# Patient Record
Sex: MALE | Race: WHITE | Employment: FULL TIME | URBAN - METROPOLITAN AREA
[De-identification: names, ages, dates, MRNs, and addresses within clinical notes are randomized per-mention and may not be internally consistent; named-entity substitution may affect disease eponyms.]

---

## 2018-05-15 LAB
ABSOL LYMPHOCYTES (HISTORICAL): 3.3 K/UL (ref 0.5–4)
ALBUMIN SERPL BCP-MCNC: 4.6 G/DL (ref 3–5.2)
ALLEN TEST (HISTORICAL): ABNORMAL
ALP SERPL-CCNC: 88 U/L (ref 43–122)
ALT SERPL W P-5'-P-CCNC: 67 U/L (ref 9–52)
AMMONIA PLAS-SCNC: 107 UMOL/L (ref 9–33)
AMORPHOUS MATERIAL (HISTORICAL): ABNORMAL
AMPHETAMINE URINE (HISTORICAL): POSITIVE
ANION GAP SERPL CALCULATED.3IONS-SCNC: 26 MMOL/L (ref 5–14)
APTT PPP: 26 SEC (ref 23–31)
AST SERPL W P-5'-P-CCNC: 42 U/L (ref 17–59)
BACTERIA UR QL AUTO: ABNORMAL
BANDS (HISTORICAL): 2 % (ref 3–11)
BARBITURATE URINE (HISTORICAL): NEGATIVE
BASE DEFICIT (HISTORICAL): 5.8 "-"
BENZODIAZEPINE URINE (HISTORICAL): NEGATIVE
BILIRUB SERPL-MCNC: 0.7 MG/DL
BILIRUB UR QL STRIP: NEGATIVE MG/DL
BUN SERPL-MCNC: 21 MG/DL (ref 5–25)
CALCIUM SERPL-MCNC: 9.3 MG/DL (ref 8.4–10.2)
CALLED AND READ BACK BY. (HISTORICAL): NORMAL
CALLED AND READ BACK BY. (HISTORICAL): NORMAL
CARBOXYHEMOGLOBIN  (HISTORICAL): 2.5 % (ref 0.5–1.5)
CASTS/CASTS TYPE (HISTORICAL): 2 /LPF
CHLORIDE SERPL-SCNC: 101 MEQ/L (ref 97–108)
CK SERPL-CCNC: 309 U/L (ref 55–170)
CK SERPL-CCNC: 350 U/L (ref 55–170)
CK SERPL-CCNC: 361 U/L (ref 55–170)
CLARITY UR: CLEAR
CO2 SERPL-SCNC: 10 MMOL/L (ref 22–30)
COCAINE (METAB.), URINE (HISTORICAL): NEGATIVE
COLLECTION SITE (HISTORICAL): ABNORMAL
COLOR UR: ABNORMAL
COMMENT (HISTORICAL): ABNORMAL
CREATINE KINASE-MB FRACTION (HISTORICAL): 2.72 NG/ML (ref 0–2.37)
CREATINE KINASE-MB FRACTION (HISTORICAL): 2.84 NG/ML (ref 0–2.37)
CREATINE, SERUM (HISTORICAL): 1.32 MG/DL (ref 0.7–1.5)
CRYSTAL TYPE (HISTORICAL): ABNORMAL /HPF
DEPRECATED RDW RBC AUTO: 14.1 %
DRUG COMMENT (HISTORICAL): ABNORMAL
EGFR (HISTORICAL): 55 ML/MIN/1.73 M2
EOSINOPHIL # BLD AUTO: 0.1 K/UL (ref 0–0.4)
EOSINOPHIL NFR BLD AUTO: 1 % (ref 0–6)
GLUCOSE SERPL-MCNC: 140 MG/DL (ref 70–99)
GLUCOSE SERPL-MCNC: 159 MG/DL (ref 70–99)
GLUCOSE SERPL-MCNC: 342 MG/DL (ref 70–99)
GLUCOSE UR STRIP-MCNC: 100 MG/DL
HCO3 BLDA-SCNC: 20.1 MMOL/L (ref 22–26)
HCT VFR BLD AUTO: 46.8 % (ref 41–53)
HGB BLD-MCNC: 15.5 G/DL (ref 13.5–17.5)
HGB UR QL STRIP.AUTO: ABNORMAL
KETONES UR STRIP-MCNC: 50 MG/DL
LACTATE SERPL-SCNC: 0.8 MMOL/L (ref 0.7–2.1)
LACTATE SERPL-SCNC: 11.6 MMOL/L (ref 0.7–2.1)
LEUKOCYTE ESTERASE UR QL STRIP: ABNORMAL
LIPASE SERPL-CCNC: 98 U/L (ref 23–300)
LITER FLOW (HISTORICAL): 15 LFLOW
LYMPHOCYTES NFR BLD AUTO: 24 % (ref 25–45)
MAGNESIUM SERPL-MCNC: 2.2 MG/DL (ref 1.6–2.3)
MCH RBC QN AUTO: 28.3 PG (ref 26–34)
MCHC RBC AUTO-ENTMCNC: 33.1 % (ref 31–36)
MCV RBC AUTO: 85 FL (ref 80–100)
MDMA (GC/MS) (HISTORICAL): POSITIVE
METHADONE URINE (HISTORICAL): NEGATIVE
METHAMPHETAMINE URINE (HISTORICAL): POSITIVE
METHGB MFR BLDCO: 0.7 %
MONOCYTES # BLD AUTO: 1.2 K/UL (ref 0.2–0.9)
MONOCYTES NFR BLD AUTO: 9 % (ref 1–10)
MUCOUS THREADS URNS QL MICRO: ABNORMAL
NEUTROPHILS ABS COUNT (HISTORICAL): 9.1 K/UL (ref 1.8–7.8)
NEUTS SEG NFR BLD AUTO: 64 % (ref 45–65)
NITRITE UR QL STRIP: NEGATIVE
NON-SQ EPI CELLS URNS QL MICRO: ABNORMAL
O2 SAT (HISTORICAL): 97.1 % (ref 95–100)
O2 SAT CALCULATED (HISTORICAL): 100 % (ref 95–100)
OPIATES (HISTORICAL): NEGATIVE
OTHER STN SPEC: ABNORMAL
OXYCODONE (HISTORICAL): NEGATIVE
OXYGEN MODE (HISTORICAL): ABNORMAL
OXYHGB MFR BLDA: 94 %
PCO2 BLDA: 40 MMHG (ref 35–45)
PH BLDA: 7.31 [PH] (ref 7.35–7.45)
PH UR STRIP.AUTO: 5 [PH] (ref 4.5–8)
PHENCYCLIDINE URINE (HISTORICAL): NEGATIVE
PLATELET # BLD AUTO: 340 K/MCL (ref 150–450)
PO2 BLDA: 271 MMHG (ref 80–105)
POTASSIUM SERPL-SCNC: 4.2 MEQ/L (ref 3.6–5)
PROT UR STRIP-MCNC: >=500 MG/DL
PT - I.N. RATIO (HISTORICAL): 1 RATIO(INR)
PT, PATIENT (HISTORICAL): 10 SEC (ref 9.2–11.1)
RBC # BLD AUTO: 5.48 M/MCL (ref 4.5–5.9)
RBC #/AREA URNS AUTO: ABNORMAL /HPF
RBC MORPHOLOGY (HISTORICAL): ABNORMAL
SODIUM SERPL-SCNC: 137 MEQ/L (ref 137–147)
SP GR UR STRIP.AUTO: 1.02 (ref 1–1.04)
THC URINE (HISTORICAL): NEGATIVE
TOTAL PROTEIN (HISTORICAL): 7.3 G/DL (ref 5.9–8.4)
TRICYCLICS URINE (HISTORICAL): NEGATIVE
TROPONIN I SERPL-MCNC: 0.04 NG/ML (ref 0–0.03)
TROPONIN I SERPL-MCNC: 0.04 NG/ML (ref 0–0.03)
TROPONIN I SERPL-MCNC: <0.01 NG/ML (ref 0–0.03)
TSH SERPL DL<=0.05 MIU/L-ACNC: 2.63 UIU/ML (ref 0.47–4.68)
UROBILINOGEN UR QL STRIP.AUTO: 1 MG/DL (ref 0–1)
WBC # BLD AUTO: 13.7 K/MCL (ref 4.5–11)
WBC #/AREA URNS AUTO: 5 /HPF

## 2018-05-16 LAB
ABSOL LYMPHOCYTES (HISTORICAL): 2.1 K/UL (ref 0.5–4)
ALBUMIN SERPL BCP-MCNC: 3.7 G/DL (ref 3–5.2)
ALP SERPL-CCNC: 74 U/L (ref 43–122)
ALT SERPL W P-5'-P-CCNC: 65 U/L (ref 9–52)
AMMONIA PLAS-SCNC: <9 UMOL/L (ref 9–33)
ANION GAP SERPL CALCULATED.3IONS-SCNC: 9 MMOL/L (ref 5–14)
AST SERPL W P-5'-P-CCNC: 34 U/L (ref 17–59)
BASOPHILS # BLD AUTO: 0.1 K/UL (ref 0–0.1)
BASOPHILS # BLD AUTO: 1 % (ref 0–1)
BILIRUB SERPL-MCNC: 0.5 MG/DL
BUN SERPL-MCNC: 19 MG/DL (ref 5–25)
CALCIUM SERPL-MCNC: 8.7 MG/DL (ref 8.4–10.2)
CHLORIDE SERPL-SCNC: 108 MEQ/L (ref 97–108)
CK SERPL-CCNC: 323 U/L (ref 55–170)
CO2 SERPL-SCNC: 21 MMOL/L (ref 22–30)
CREATINE, SERUM (HISTORICAL): 0.82 MG/DL (ref 0.7–1.5)
DEPRECATED RDW RBC AUTO: 14 %
EGFR (HISTORICAL): >60 ML/MIN/1.73 M2
EOSINOPHIL # BLD AUTO: 0.2 K/UL (ref 0–0.4)
EOSINOPHIL NFR BLD AUTO: 1 % (ref 0–6)
EST. AVERAGE GLUCOSE BLD GHB EST-MCNC: 154 MG/DL
GLUCOSE SERPL-MCNC: 134 MG/DL (ref 70–99)
GLUCOSE SERPL-MCNC: 159 MG/DL (ref 70–99)
GLUCOSE SERPL-MCNC: 196 MG/DL (ref 70–99)
GLUCOSE SERPL-MCNC: 204 MG/DL (ref 70–99)
GLUCOSE SERPL-MCNC: 263 MG/DL (ref 70–99)
HBA1C MFR BLD HPLC: 7 %
HCT VFR BLD AUTO: 41.9 % (ref 41–53)
HEPATITIS A IGM ANTIBODY (HISTORICAL): NORMAL
HEPATITIS B CORE IGM ANTIBODY (HISTORICAL): NORMAL
HEPATITIS B SURFACE AG CONFIRMATION (HISTORICAL): NORMAL
HEPATITIS C ANTIBODY (HISTORICAL): NORMAL
HGB BLD-MCNC: 14 G/DL (ref 13.5–17.5)
LACTATE SERPL-SCNC: 0.7 MMOL/L (ref 0.7–2.1)
LYMPHOCYTES NFR BLD AUTO: 16 % (ref 25–45)
MCH RBC QN AUTO: 28.2 PG (ref 26–34)
MCHC RBC AUTO-ENTMCNC: 33.5 % (ref 31–36)
MCV RBC AUTO: 84 FL (ref 80–100)
MONOCYTES # BLD AUTO: 1.2 K/UL (ref 0.2–0.9)
MONOCYTES NFR BLD AUTO: 9 % (ref 1–10)
NEUTROPHILS ABS COUNT (HISTORICAL): 9.5 K/UL (ref 1.8–7.8)
NEUTS SEG NFR BLD AUTO: 73 % (ref 45–65)
PLATELET # BLD AUTO: 254 K/MCL (ref 150–450)
POTASSIUM SERPL-SCNC: 4.3 MEQ/L (ref 3.6–5)
RBC # BLD AUTO: 4.97 M/MCL (ref 4.5–5.9)
SODIUM SERPL-SCNC: 138 MEQ/L (ref 137–147)
TOTAL PROTEIN (HISTORICAL): 6.4 G/DL (ref 5.9–8.4)
TROPONIN I SERPL-MCNC: 0.01 NG/ML (ref 0–0.03)
TSH SERPL DL<=0.05 MIU/L-ACNC: 0.73 UIU/ML (ref 0.47–4.68)
WBC # BLD AUTO: 13 K/MCL (ref 4.5–11)

## 2018-05-17 LAB
GLUCOSE SERPL-MCNC: 170 MG/DL (ref 70–99)
GLUCOSE SERPL-MCNC: 176 MG/DL (ref 70–99)
GLUCOSE SERPL-MCNC: 219 MG/DL (ref 70–99)
GLUCOSE SERPL-MCNC: 259 MG/DL (ref 70–99)

## 2018-05-18 LAB
GLUCOSE SERPL-MCNC: 175 MG/DL (ref 70–99)
GLUCOSE SERPL-MCNC: 180 MG/DL (ref 70–99)
GLUCOSE SERPL-MCNC: 237 MG/DL (ref 70–99)
GLUCOSE SERPL-MCNC: 283 MG/DL (ref 70–99)

## 2018-05-19 LAB
GLUCOSE SERPL-MCNC: 176 MG/DL (ref 70–99)
GLUCOSE SERPL-MCNC: 200 MG/DL (ref 70–99)
GLUCOSE SERPL-MCNC: 219 MG/DL (ref 70–99)
GLUCOSE SERPL-MCNC: 226 MG/DL (ref 70–99)

## 2018-05-20 LAB
GLUCOSE SERPL-MCNC: 153 MG/DL (ref 70–99)
GLUCOSE SERPL-MCNC: 178 MG/DL (ref 70–99)
GLUCOSE SERPL-MCNC: 220 MG/DL (ref 70–99)
GLUCOSE SERPL-MCNC: 255 MG/DL (ref 70–99)

## 2018-05-21 LAB
GLUCOSE SERPL-MCNC: 172 MG/DL (ref 70–99)
GLUCOSE SERPL-MCNC: 215 MG/DL (ref 70–99)
GLUCOSE SERPL-MCNC: 229 MG/DL (ref 70–99)
GLUCOSE SERPL-MCNC: 235 MG/DL (ref 70–99)

## 2018-05-22 LAB
GLUCOSE SERPL-MCNC: 105 MG/DL (ref 70–99)
GLUCOSE SERPL-MCNC: 147 MG/DL (ref 70–99)
GLUCOSE SERPL-MCNC: 183 MG/DL (ref 70–99)
GLUCOSE SERPL-MCNC: 230 MG/DL (ref 70–99)

## 2018-05-23 LAB
ABSOL LYMPHOCYTES (HISTORICAL): 3 K/UL (ref 0.5–4)
BASOPHILS # BLD AUTO: 0.1 K/UL (ref 0–0.1)
BASOPHILS # BLD AUTO: 1 % (ref 0–1)
DEPRECATED RDW RBC AUTO: 14 %
EOSINOPHIL # BLD AUTO: 0.3 K/UL (ref 0–0.4)
EOSINOPHIL NFR BLD AUTO: 2 % (ref 0–6)
GLUCOSE SERPL-MCNC: 175 MG/DL (ref 70–99)
GLUCOSE SERPL-MCNC: 217 MG/DL (ref 70–99)
HCT VFR BLD AUTO: 46.1 % (ref 41–53)
HGB BLD-MCNC: 15.5 G/DL (ref 13.5–17.5)
LYMPHOCYTES NFR BLD AUTO: 23 % (ref 25–45)
MCH RBC QN AUTO: 28.5 PG (ref 26–34)
MCHC RBC AUTO-ENTMCNC: 33.6 % (ref 31–36)
MCV RBC AUTO: 85 FL (ref 80–100)
MONOCYTES # BLD AUTO: 0.9 K/UL (ref 0.2–0.9)
MONOCYTES NFR BLD AUTO: 7 % (ref 1–10)
NEUTROPHILS ABS COUNT (HISTORICAL): 8.9 K/UL (ref 1.8–7.8)
NEUTS SEG NFR BLD AUTO: 67 % (ref 45–65)
PLATELET # BLD AUTO: 303 K/MCL (ref 150–450)
RBC # BLD AUTO: 5.43 M/MCL (ref 4.5–5.9)
WBC # BLD AUTO: 13.2 K/MCL (ref 4.5–11)

## 2018-09-13 ENCOUNTER — APPOINTMENT (EMERGENCY)
Dept: RADIOLOGY | Facility: HOSPITAL | Age: 63
DRG: 683 | End: 2018-09-13
Payer: COMMERCIAL

## 2018-09-13 ENCOUNTER — APPOINTMENT (EMERGENCY)
Dept: CT IMAGING | Facility: HOSPITAL | Age: 63
DRG: 683 | End: 2018-09-13
Payer: COMMERCIAL

## 2018-09-13 ENCOUNTER — HOSPITAL ENCOUNTER (INPATIENT)
Facility: HOSPITAL | Age: 63
LOS: 4 days | DRG: 683 | End: 2018-09-17
Attending: EMERGENCY MEDICINE | Admitting: INTERNAL MEDICINE
Payer: COMMERCIAL

## 2018-09-13 DIAGNOSIS — R44.0 AUDITORY HALLUCINATIONS: ICD-10-CM

## 2018-09-13 DIAGNOSIS — D72.829 LEUKOCYTOSIS: ICD-10-CM

## 2018-09-13 DIAGNOSIS — N17.9 AKI (ACUTE KIDNEY INJURY) (HCC): Primary | ICD-10-CM

## 2018-09-13 DIAGNOSIS — R45.851 SUICIDAL IDEATION: ICD-10-CM

## 2018-09-13 DIAGNOSIS — F43.10 PTSD (POST-TRAUMATIC STRESS DISORDER): ICD-10-CM

## 2018-09-13 PROBLEM — R65.10 SIRS (SYSTEMIC INFLAMMATORY RESPONSE SYNDROME) (HCC): Status: ACTIVE | Noted: 2018-09-13

## 2018-09-13 PROBLEM — R79.89 ELEVATED D-DIMER: Status: ACTIVE | Noted: 2018-09-13

## 2018-09-13 PROBLEM — R00.0 SINUS TACHYCARDIA: Status: ACTIVE | Noted: 2018-09-13

## 2018-09-13 PROBLEM — F15.10 METHAMPHETAMINE USE (HCC): Status: ACTIVE | Noted: 2018-09-13

## 2018-09-13 PROBLEM — R07.9 CHEST PAIN: Status: ACTIVE | Noted: 2018-09-13

## 2018-09-13 PROBLEM — E11.9 DIABETES (HCC): Status: ACTIVE | Noted: 2018-09-13

## 2018-09-13 PROBLEM — E87.2 METABOLIC ACIDOSIS: Status: ACTIVE | Noted: 2018-09-13

## 2018-09-13 PROBLEM — G93.41 METABOLIC ENCEPHALOPATHY: Status: ACTIVE | Noted: 2018-09-13

## 2018-09-13 PROBLEM — R73.9 HYPERGLYCEMIA: Status: ACTIVE | Noted: 2018-09-13

## 2018-09-13 LAB
ACETONE SERPL-MCNC: NEGATIVE MG/DL
ALBUMIN SERPL BCP-MCNC: 3.8 G/DL (ref 3.5–5)
ALP SERPL-CCNC: 85 U/L (ref 46–116)
ALT SERPL W P-5'-P-CCNC: 82 U/L (ref 12–78)
AMPHETAMINES SERPL QL SCN: POSITIVE
ANION GAP SERPL CALCULATED.3IONS-SCNC: 16 MMOL/L (ref 4–13)
APAP SERPL-MCNC: <2 UG/ML (ref 10–30)
APTT PPP: 29 SECONDS (ref 24–36)
AST SERPL W P-5'-P-CCNC: 56 U/L (ref 5–45)
BACTERIA UR QL AUTO: ABNORMAL /HPF
BARBITURATES UR QL: NEGATIVE
BASE EX.OXY STD BLDV CALC-SCNC: 86.7 % (ref 60–80)
BASE EXCESS BLDV CALC-SCNC: -3 MMOL/L
BASOPHILS # BLD AUTO: 0.05 THOUSANDS/ΜL (ref 0–0.1)
BASOPHILS NFR BLD AUTO: 0 % (ref 0–1)
BENZODIAZ UR QL: NEGATIVE
BILIRUB SERPL-MCNC: 0.68 MG/DL (ref 0.2–1)
BILIRUB UR QL STRIP: ABNORMAL
BUN SERPL-MCNC: 25 MG/DL (ref 5–25)
CALCIUM SERPL-MCNC: 9.3 MG/DL (ref 8.3–10.1)
CHLORIDE SERPL-SCNC: 100 MMOL/L (ref 100–108)
CK MB SERPL-MCNC: 4.3 NG/ML (ref 0–5)
CK MB SERPL-MCNC: <1 % (ref 0–2.5)
CK SERPL-CCNC: 891 U/L (ref 39–308)
CLARITY UR: ABNORMAL
CO2 SERPL-SCNC: 20 MMOL/L (ref 21–32)
COCAINE UR QL: NEGATIVE
COLOR UR: ABNORMAL
CREAT SERPL-MCNC: 1.77 MG/DL (ref 0.6–1.3)
DEPRECATED D DIMER PPP: 685 NG/ML (FEU) (ref 0–424)
EOSINOPHIL # BLD AUTO: 0.02 THOUSAND/ΜL (ref 0–0.61)
EOSINOPHIL NFR BLD AUTO: 0 % (ref 0–6)
ERYTHROCYTE [DISTWIDTH] IN BLOOD BY AUTOMATED COUNT: 13.5 % (ref 11.6–15.1)
ETHANOL SERPL-MCNC: <3 MG/DL (ref 0–3)
GFR SERPL CREATININE-BSD FRML MDRD: 40 ML/MIN/1.73SQ M
GLUCOSE SERPL-MCNC: 116 MG/DL (ref 65–140)
GLUCOSE SERPL-MCNC: 335 MG/DL (ref 65–140)
GLUCOSE UR STRIP-MCNC: ABNORMAL MG/DL
HCO3 BLDV-SCNC: 20 MMOL/L (ref 24–30)
HCT VFR BLD AUTO: 45.8 % (ref 36.5–49.3)
HGB BLD-MCNC: 15.2 G/DL (ref 12–17)
HGB UR QL STRIP.AUTO: ABNORMAL
HYALINE CASTS #/AREA URNS LPF: ABNORMAL /LPF
IMM GRANULOCYTES # BLD AUTO: 0.22 THOUSAND/UL (ref 0–0.2)
IMM GRANULOCYTES NFR BLD AUTO: 1 % (ref 0–2)
INR PPP: 1.04 (ref 0.86–1.17)
KETONES UR STRIP-MCNC: ABNORMAL MG/DL
LACTATE SERPL-SCNC: 1.8 MMOL/L (ref 0.5–2)
LEUKOCYTE ESTERASE UR QL STRIP: NEGATIVE
LYMPHOCYTES # BLD AUTO: 1.48 THOUSANDS/ΜL (ref 0.6–4.47)
LYMPHOCYTES NFR BLD AUTO: 8 % (ref 14–44)
MCH RBC QN AUTO: 28.6 PG (ref 26.8–34.3)
MCHC RBC AUTO-ENTMCNC: 33.2 G/DL (ref 31.4–37.4)
MCV RBC AUTO: 86 FL (ref 82–98)
METHADONE UR QL: NEGATIVE
MONOCYTES # BLD AUTO: 0.99 THOUSAND/ΜL (ref 0.17–1.22)
MONOCYTES NFR BLD AUTO: 5 % (ref 4–12)
MUCOUS THREADS UR QL AUTO: ABNORMAL
NEUTROPHILS # BLD AUTO: 15.75 THOUSANDS/ΜL (ref 1.85–7.62)
NEUTS SEG NFR BLD AUTO: 86 % (ref 43–75)
NITRITE UR QL STRIP: NEGATIVE
NON-SQ EPI CELLS URNS QL MICRO: ABNORMAL /HPF
NRBC BLD AUTO-RTO: 0 /100 WBCS
O2 CT BLDV-SCNC: 19 ML/DL
OPIATES UR QL SCN: NEGATIVE
PCO2 BLDV: 30.9 MM HG (ref 42–50)
PCP UR QL: NEGATIVE
PH BLDV: 7.43 [PH] (ref 7.3–7.4)
PH UR STRIP.AUTO: 5.5 [PH] (ref 4.5–8)
PLATELET # BLD AUTO: 312 THOUSANDS/UL (ref 149–390)
PLATELET # BLD AUTO: 339 THOUSANDS/UL (ref 149–390)
PMV BLD AUTO: 9.4 FL (ref 8.9–12.7)
PMV BLD AUTO: 9.8 FL (ref 8.9–12.7)
PO2 BLDV: 55.9 MM HG (ref 35–45)
POTASSIUM SERPL-SCNC: 4.4 MMOL/L (ref 3.5–5.3)
PROCALCITONIN SERPL-MCNC: 0.16 NG/ML
PROT SERPL-MCNC: 8 G/DL (ref 6.4–8.2)
PROT UR STRIP-MCNC: >=300 MG/DL
PROTHROMBIN TIME: 13.7 SECONDS (ref 11.8–14.2)
RBC # BLD AUTO: 5.32 MILLION/UL (ref 3.88–5.62)
RBC #/AREA URNS AUTO: ABNORMAL /HPF
SALICYLATES SERPL-MCNC: 3.4 MG/DL (ref 3–20)
SODIUM SERPL-SCNC: 136 MMOL/L (ref 136–145)
SP GR UR STRIP.AUTO: >=1.03 (ref 1–1.03)
THC UR QL: NEGATIVE
TROPONIN I SERPL-MCNC: <0.02 NG/ML
TROPONIN I SERPL-MCNC: <0.02 NG/ML
UROBILINOGEN UR QL STRIP.AUTO: 0.2 E.U./DL
WBC # BLD AUTO: 18.51 THOUSAND/UL (ref 4.31–10.16)
WBC #/AREA URNS AUTO: ABNORMAL /HPF

## 2018-09-13 PROCEDURE — 84484 ASSAY OF TROPONIN QUANT: CPT | Performed by: EMERGENCY MEDICINE

## 2018-09-13 PROCEDURE — 99285 EMERGENCY DEPT VISIT HI MDM: CPT

## 2018-09-13 PROCEDURE — 85025 COMPLETE CBC W/AUTO DIFF WBC: CPT | Performed by: EMERGENCY MEDICINE

## 2018-09-13 PROCEDURE — 80329 ANALGESICS NON-OPIOID 1 OR 2: CPT | Performed by: STUDENT IN AN ORGANIZED HEALTH CARE EDUCATION/TRAINING PROGRAM

## 2018-09-13 PROCEDURE — 71046 X-RAY EXAM CHEST 2 VIEWS: CPT

## 2018-09-13 PROCEDURE — 83605 ASSAY OF LACTIC ACID: CPT | Performed by: STUDENT IN AN ORGANIZED HEALTH CARE EDUCATION/TRAINING PROGRAM

## 2018-09-13 PROCEDURE — 85049 AUTOMATED PLATELET COUNT: CPT | Performed by: PHYSICIAN ASSISTANT

## 2018-09-13 PROCEDURE — 36415 COLL VENOUS BLD VENIPUNCTURE: CPT | Performed by: EMERGENCY MEDICINE

## 2018-09-13 PROCEDURE — 93005 ELECTROCARDIOGRAM TRACING: CPT

## 2018-09-13 PROCEDURE — 99223 1ST HOSP IP/OBS HIGH 75: CPT | Performed by: PHYSICIAN ASSISTANT

## 2018-09-13 PROCEDURE — 94640 AIRWAY INHALATION TREATMENT: CPT

## 2018-09-13 PROCEDURE — 80307 DRUG TEST PRSMV CHEM ANLYZR: CPT | Performed by: STUDENT IN AN ORGANIZED HEALTH CARE EDUCATION/TRAINING PROGRAM

## 2018-09-13 PROCEDURE — 82009 KETONE BODYS QUAL: CPT | Performed by: STUDENT IN AN ORGANIZED HEALTH CARE EDUCATION/TRAINING PROGRAM

## 2018-09-13 PROCEDURE — G0480 DRUG TEST DEF 1-7 CLASSES: HCPCS | Performed by: STUDENT IN AN ORGANIZED HEALTH CARE EDUCATION/TRAINING PROGRAM

## 2018-09-13 PROCEDURE — 80053 COMPREHEN METABOLIC PANEL: CPT | Performed by: EMERGENCY MEDICINE

## 2018-09-13 PROCEDURE — 81001 URINALYSIS AUTO W/SCOPE: CPT

## 2018-09-13 PROCEDURE — 83036 HEMOGLOBIN GLYCOSYLATED A1C: CPT | Performed by: PHYSICIAN ASSISTANT

## 2018-09-13 PROCEDURE — 36415 COLL VENOUS BLD VENIPUNCTURE: CPT | Performed by: STUDENT IN AN ORGANIZED HEALTH CARE EDUCATION/TRAINING PROGRAM

## 2018-09-13 PROCEDURE — 82948 REAGENT STRIP/BLOOD GLUCOSE: CPT

## 2018-09-13 PROCEDURE — 80320 DRUG SCREEN QUANTALCOHOLS: CPT | Performed by: STUDENT IN AN ORGANIZED HEALTH CARE EDUCATION/TRAINING PROGRAM

## 2018-09-13 PROCEDURE — 85610 PROTHROMBIN TIME: CPT | Performed by: STUDENT IN AN ORGANIZED HEALTH CARE EDUCATION/TRAINING PROGRAM

## 2018-09-13 PROCEDURE — 84484 ASSAY OF TROPONIN QUANT: CPT | Performed by: PHYSICIAN ASSISTANT

## 2018-09-13 PROCEDURE — 96360 HYDRATION IV INFUSION INIT: CPT

## 2018-09-13 PROCEDURE — 84145 PROCALCITONIN (PCT): CPT | Performed by: STUDENT IN AN ORGANIZED HEALTH CARE EDUCATION/TRAINING PROGRAM

## 2018-09-13 PROCEDURE — 82550 ASSAY OF CK (CPK): CPT | Performed by: PHYSICIAN ASSISTANT

## 2018-09-13 PROCEDURE — 87040 BLOOD CULTURE FOR BACTERIA: CPT | Performed by: STUDENT IN AN ORGANIZED HEALTH CARE EDUCATION/TRAINING PROGRAM

## 2018-09-13 PROCEDURE — 82553 CREATINE MB FRACTION: CPT | Performed by: PHYSICIAN ASSISTANT

## 2018-09-13 PROCEDURE — 70450 CT HEAD/BRAIN W/O DYE: CPT

## 2018-09-13 PROCEDURE — 82805 BLOOD GASES W/O2 SATURATION: CPT | Performed by: STUDENT IN AN ORGANIZED HEALTH CARE EDUCATION/TRAINING PROGRAM

## 2018-09-13 PROCEDURE — 85730 THROMBOPLASTIN TIME PARTIAL: CPT | Performed by: STUDENT IN AN ORGANIZED HEALTH CARE EDUCATION/TRAINING PROGRAM

## 2018-09-13 PROCEDURE — 85379 FIBRIN DEGRADATION QUANT: CPT | Performed by: STUDENT IN AN ORGANIZED HEALTH CARE EDUCATION/TRAINING PROGRAM

## 2018-09-13 RX ORDER — ALBUTEROL SULFATE 2.5 MG/3ML
5 SOLUTION RESPIRATORY (INHALATION) ONCE
Status: COMPLETED | OUTPATIENT
Start: 2018-09-13 | End: 2018-09-13

## 2018-09-13 RX ORDER — ONDANSETRON 2 MG/ML
4 INJECTION INTRAMUSCULAR; INTRAVENOUS EVERY 6 HOURS PRN
Status: DISCONTINUED | OUTPATIENT
Start: 2018-09-13 | End: 2018-09-17 | Stop reason: HOSPADM

## 2018-09-13 RX ORDER — SODIUM CHLORIDE 9 MG/ML
125 INJECTION, SOLUTION INTRAVENOUS CONTINUOUS
Status: DISCONTINUED | OUTPATIENT
Start: 2018-09-13 | End: 2018-09-16

## 2018-09-13 RX ORDER — ASPIRIN 325 MG
325 TABLET ORAL ONCE
Status: COMPLETED | OUTPATIENT
Start: 2018-09-13 | End: 2018-09-13

## 2018-09-13 RX ORDER — HYDRALAZINE HYDROCHLORIDE 20 MG/ML
10 INJECTION INTRAMUSCULAR; INTRAVENOUS EVERY 6 HOURS PRN
Status: DISCONTINUED | OUTPATIENT
Start: 2018-09-13 | End: 2018-09-17 | Stop reason: HOSPADM

## 2018-09-13 RX ORDER — NICOTINE 21 MG/24HR
1 PATCH, TRANSDERMAL 24 HOURS TRANSDERMAL DAILY
Status: DISCONTINUED | OUTPATIENT
Start: 2018-09-14 | End: 2018-09-17 | Stop reason: HOSPADM

## 2018-09-13 RX ORDER — NITROGLYCERIN 0.4 MG/1
0.4 TABLET SUBLINGUAL
Status: DISCONTINUED | OUTPATIENT
Start: 2018-09-13 | End: 2018-09-17 | Stop reason: HOSPADM

## 2018-09-13 RX ORDER — ASPIRIN 81 MG/1
81 TABLET, CHEWABLE ORAL DAILY
Status: DISCONTINUED | OUTPATIENT
Start: 2018-09-14 | End: 2018-09-17 | Stop reason: HOSPADM

## 2018-09-13 RX ORDER — HEPARIN SODIUM 5000 [USP'U]/ML
5000 INJECTION, SOLUTION INTRAVENOUS; SUBCUTANEOUS EVERY 8 HOURS SCHEDULED
Status: DISCONTINUED | OUTPATIENT
Start: 2018-09-13 | End: 2018-09-17 | Stop reason: HOSPADM

## 2018-09-13 RX ORDER — LORAZEPAM 2 MG/ML
1 INJECTION INTRAMUSCULAR EVERY 4 HOURS PRN
Status: DISCONTINUED | OUTPATIENT
Start: 2018-09-13 | End: 2018-09-17 | Stop reason: HOSPADM

## 2018-09-13 RX ADMIN — SODIUM CHLORIDE 1000 ML: 0.9 INJECTION, SOLUTION INTRAVENOUS at 16:00

## 2018-09-13 RX ADMIN — ASPIRIN 325 MG: 325 TABLET ORAL at 23:00

## 2018-09-13 RX ADMIN — ALBUTEROL SULFATE 5 MG: 2.5 SOLUTION RESPIRATORY (INHALATION) at 17:03

## 2018-09-13 RX ADMIN — HEPARIN SODIUM 5000 UNITS: 5000 INJECTION INTRAVENOUS; SUBCUTANEOUS at 22:59

## 2018-09-13 RX ADMIN — SODIUM CHLORIDE 125 ML/HR: 0.9 INJECTION, SOLUTION INTRAVENOUS at 23:00

## 2018-09-13 RX ADMIN — IPRATROPIUM BROMIDE 0.5 MG: 0.5 SOLUTION RESPIRATORY (INHALATION) at 17:03

## 2018-09-13 NOTE — ED ATTENDING ATTESTATION
Samuel Girard MD, saw and evaluated the patient  I have discussed the patient with the resident/non-physician practitioner and agree with the resident's/non-physician practitioner's findings, Plan of Care, and MDM as documented in the resident's/non-physician practitioner's note, except where noted  All available labs and Radiology studies were reviewed  At this point I agree with the current assessment done in the Emergency Department  I have conducted an independent evaluation of this patient a history and physical is as follows:  Patient is a 71-year-old male, history of diabetes, hypertension, COPD, drug abuse, comes in with history of cocaine and meth use, has been having hallucinations; denies headache, fever  On exam patient is noted to have tachycardia, afebrile, blood pressure stable, oxygen saturation 96%, lung exam shows bilateral diffuse wheezing, cardiovascular tachycardia, regular rhythm, no peripheral edema, abdomen soft, tenderness in distension lower abdomen  Differential diagnosis: Altered mental status, tachycardia, drug abuse, sepsis, Pneumonia, UTI, COPD exacerbation, possible PE due to tachycardia with no fever  We will check labs including CBC, CMP, EKG, troponin, D-dimer; chest x-ray EKG; will give DuoNeb  Labs showed Leucocytosis, SIRS identified based on Tachycardia and Leucocytosis, Lactic acid was sent which was normal; GREG was noted with AG Metabolic acidosis in setting of Hyperglycemia, Acetone and VBG were added to labs; D-dimer was positive although if age adjustment considered its not significantly elevated, with GREG, and low suspicion of PE after return of lab work which is consistent with either sepsis or drug related effects, therefore decided to hold off on CT imaging for possible PE as risk of administering IV Dye outweighs benefit  CXR normal  Urine pending  If no clear source of sepsis, we will get CT Head   Patient under Resident Care, would need admission for possible Sepsis Vs drug related psychosis Vs COPD exacerbation; pending further workup; would need Psych eval as inpatient  Plan discussed with Dr Jalen Shirley, ER Attending during sign out      Critical Care Time  CritCare Time    Procedures

## 2018-09-13 NOTE — ED PROVIDER NOTES
History  Chief Complaint   Patient presents with    Recreational Drug Use     Pt states taking "speed" aka "jihan" on Monday and has been hallucinating since Tuesday  Pt states he sees and feels like someone is going to shoot him in the head  Pt also states he has been unable to urinate since Tuesday  Pt c/io left sided Chest tightness and shortness of breath that started today  This is a 80-year-old male with a past medical history hypertension, hyperlipidemia, and diabetes who presents to the emergency department this afternoon with visual hallucinations since Monday  Patient states that he has been snorting methamphetamine since Monday and ran out last night  He states that prior to the methamphetamine he was not having any hallucinations and he felt his normal self  After snorting methamphetamine the patient states that he feels like someone is sneaking up behind him in order to shoot him and that snakes were chasing him  Patient also endorses having suicidal ideations over the past couple days but denies any current are active suicidal ideations  Patient states that he has been on disability for the past four weeks since he tore his hamstring on a fishing trip  He usually works as a   Patient states that he typically takes medications for his high blood pressure and diabetes but he has an over the past couple days since he has been doing meth  Patient endorses having left-sided chest pain but no associated diaphoresis, shortness of breath, nausea, vomiting, or radiation of the pain  He denies any prior heart history  None       Past Medical History:   Diagnosis Date    Asbestos exposure     Diabetes mellitus ([facility] [address] )     Drug use     Hypertension     Lung disease        Past Surgical History:   Procedure Laterality Date    NO PAST SURGERIES         No family history on file  I have reviewed and agree with the history as documented      Social History   Substance Use Topics    Smoking status: Current Every Day Smoker     Packs/day: 2 00    Smokeless tobacco: Never Used    Alcohol use No        Review of Systems   Constitutional: Negative for chills, diaphoresis and fever  HENT: Negative for congestion, rhinorrhea, sinus pressure and sore throat  Eyes: Negative for visual disturbance  Respiratory: Negative for cough, chest tightness and shortness of breath  Cardiovascular: Positive for chest pain  Gastrointestinal: Negative for abdominal pain, constipation, diarrhea, nausea and vomiting  Genitourinary: Positive for decreased urine volume  Negative for dysuria, frequency, hematuria and urgency  Musculoskeletal: Negative for arthralgias and myalgias  Skin: Negative for color change and rash  Neurological: Negative for dizziness, numbness and headaches  Psychiatric/Behavioral: Positive for hallucinations and suicidal ideas  Physical Exam  ED Triage Vitals   Temperature Pulse Respirations Blood Pressure SpO2   09/13/18 1547 09/13/18 1547 09/13/18 1547 09/13/18 1547 09/13/18 1547   98 2 °F (36 8 °C) (!) 130 20 129/70 96 %      Temp Source Heart Rate Source Patient Position - Orthostatic VS BP Location FiO2 (%)   09/13/18 1547 09/13/18 1547 09/13/18 1547 09/13/18 1547 --   Oral Monitor Lying Right arm       Pain Score       09/13/18 2148       2           Orthostatic Vital Signs  Vitals:    09/13/18 1900 09/13/18 1930 09/13/18 2000 09/13/18 2147   BP: 127/63 138/75 133/71 130/77   Pulse: 96  92 96   Patient Position - Orthostatic VS: Lying Lying Lying Lying       Physical Exam   Constitutional: He is oriented to person, place, and time  He appears well-developed and well-nourished  No distress  HENT:   Head: Normocephalic and atraumatic  Mouth/Throat: No oropharyngeal exudate  Eyes: Conjunctivae are normal  Pupils are equal, round, and reactive to light  Right eye exhibits no discharge  Left eye exhibits no discharge  No scleral icterus     Neck: Normal range of motion  Neck supple  No JVD present  Cardiovascular: Normal rate, regular rhythm and normal heart sounds  Exam reveals no gallop and no friction rub  No murmur heard  Pulmonary/Chest: Effort normal  No stridor  No respiratory distress  He has wheezes  He has no rales  He exhibits no tenderness  Abdominal: Soft  Bowel sounds are normal  He exhibits mass ( suprapubic)  He exhibits no distension  There is tenderness ( suprapubic)  There is no guarding  Musculoskeletal: Normal range of motion  He exhibits no edema, tenderness or deformity  Neurological: He is alert and oriented to person, place, and time  No cranial nerve deficit or sensory deficit  He exhibits normal muscle tone  Skin: Skin is warm and dry  No rash noted  He is not diaphoretic  No erythema  No pallor  Psychiatric: He has a normal mood and affect  His behavior is normal    Nursing note and vitals reviewed        ED Medications  Medications   sodium chloride 0 9 % infusion (not administered)   ondansetron (ZOFRAN) injection 4 mg (not administered)   nicotine (NICODERM CQ) 14 mg/24hr TD 24 hr patch 1 patch (not administered)   aspirin tablet 325 mg (not administered)   aspirin chewable tablet 81 mg (not administered)   heparin (porcine) subcutaneous injection 5,000 Units (not administered)   insulin lispro (HumaLOG) 100 units/mL subcutaneous injection 1-6 Units (1 Units Subcutaneous Not Given 9/13/18 2256)   LORazepam (ATIVAN) 2 mg/mL injection 1 mg (not administered)   hydrALAZINE (APRESOLINE) injection 10 mg (not administered)   nitroglycerin (NITROSTAT) SL tablet 0 4 mg (not administered)   sodium chloride 0 9 % bolus 1,000 mL (0 mL Intravenous Stopped 9/13/18 1702)   albuterol inhalation solution 5 mg (5 mg Nebulization Given 9/13/18 1703)   ipratropium (ATROVENT) 0 02 % inhalation solution 0 5 mg (0 5 mg Nebulization Given 9/13/18 1703)       Diagnostic Studies  Results Reviewed     Procedure Component Value Units Date/Time    CK (with reflex to MB) [[id number]] Collected:  09/13/18 2245    Lab Status: In process Specimen:  Blood from Hand, Right Updated:  09/13/18 2252    Acetone [[id number]]  (Normal) Collected:  09/13/18 1708    Lab Status:  Final result Specimen:  Blood from Arm, Left Updated:  09/13/18 1753     Acetone, Bld Negative    Procalcitonin [[id number]]  (Normal) Collected:  09/13/18 1552    Lab Status:  Final result Specimen:  Blood from Arm, Right Updated:  09/13/18 1743     Procalcitonin 0 16 ng/ml     Urine Microscopic [[id number]]  (Abnormal) Collected:  09/13/18 1643    Lab Status:  Final result Specimen:  Urine from Urine, Other Updated:  09/13/18 1729     RBC, UA 0-1 (A) /hpf      WBC, UA 4-10 (A) /hpf      Epithelial Cells Occasional /hpf      Bacteria, UA Occasional /hpf      Hyaline Casts, UA 4-10 (A) /lpf      MUCOUS THREADS Occasional (A)    Acetaminophen level [[id number]]  (Abnormal) Collected:  09/13/18 1552    Lab Status:  Final result Specimen:  Blood from Arm, Right Updated:  09/13/18 1701     Acetaminophen Level <2 (L) ug/mL     Rapid drug screen, urine [[id number]]  (Abnormal) Collected:  09/13/18 1635    Lab Status:  Final result Specimen:  Urine from Urine, Other Updated:  09/13/18 1700     Amph/Meth UR Positive (A)     Barbiturate Ur Negative     Benzodiazepine Urine Negative     Cocaine Urine Negative     Methadone Urine Negative     Opiate Urine Negative     PCP Ur Negative     THC Urine Negative    Narrative:         FOR MEDICAL PURPOSES ONLY  IF CONFIRMATION NEEDED PLEASE CONTACT THE LAB WITHIN 5 DAYS      Drug Screen Cutoff Levels:  AMPHETAMINE/METHAMPHETAMINES  1000 ng/mL  BARBITURATES     200 ng/mL  BENZODIAZEPINES     200 ng/mL  COCAINE      300 ng/mL  METHADONE      300 ng/mL  OPIATES      300 ng/mL  PHENCYCLIDINE     25 ng/mL  THC       50 ng/mL    Ethanol [[id number]]  (Normal) Collected:  09/13/18 1552    Lab Status:  Final result Specimen:  Blood from Arm, Right Updated:  09/13/18 1656 Ethanol Lvl <3 mg/dL     Salicylate level [[id number]]  (Normal) Collected:  09/13/18 1552    Lab Status:  Final result Specimen:  Blood from Arm, Right Updated:  57/17/53 0705     Salicylate Lvl 3 4 mg/dL     POCT urinalysis dipstick [[id number]]  (Abnormal) Resulted:  09/13/18 1636    Lab Status:  Final result Specimen:  Urine Updated:  09/13/18 1636    ED Urine Macroscopic [[id number]]  (Abnormal) Collected:  09/13/18 1643    Lab Status:  Final result Specimen:  Urine Updated:  09/13/18 1633     Color, UA Carmencita     Clarity, UA Slightly Cloudy     pH, UA 5 5     Leukocytes, UA Negative     Nitrite, UA Negative     Protein, UA >=300 (A) mg/dl      Glucose,  (1/4%) (A) mg/dl      Ketones, UA 40 (2+) (A) mg/dl      Urobilinogen, UA 0 2 E U /dl      Bilirubin, UA Interference- unable to analyze (A)     Blood, UA Small (A)     Specific Gravity, UA >=1 030    Narrative:       CLINITEK RESULT    Lactic acid x2 [[id number]]  (Normal) Collected:  09/13/18 1552    Lab Status:  Final result Specimen:  Blood from Arm, Right Updated:  09/13/18 1618     LACTIC ACID 1 8 mmol/L     Narrative:         Result may be elevated if tourniquet was used during collection      D-dimer, quantitative [[id number]]  (Abnormal) Collected:  09/13/18 1552    Lab Status:  Final result Specimen:  Blood from Arm, Right Updated:  09/13/18 1614     D-Dimer, Quant 685 (H) ng/ml (FEU)     Protime-INR [[id number]]  (Normal) Collected:  09/13/18 1552    Lab Status:  Final result Specimen:  Blood from Arm, Right Updated:  09/13/18 1611     Protime 13 7 seconds      INR 1 04    APTT [[id number]]  (Normal) Collected:  09/13/18 1552    Lab Status:  Final result Specimen:  Blood from Arm, Right Updated:  09/13/18 1611     PTT 29 seconds     Blood gas, venous [[id number]]  (Abnormal) Collected:  09/13/18 1600    Lab Status:  Final result Specimen:  Blood from Arm, Left Updated:  09/13/18 1607     pH, Estiven 7 430 (H)     pCO2, Estiven 30 9 (L) mm Hg      pO2, Estiven 55 9 (H) mm Hg      HCO3, Estiven 20 0 (L) mmol/L      Base Excess, Estiven -3 0 mmol/L      O2 Content, Estiven 19 0 ml/dL      O2 HGB, VENOUS 86 7 (H) %     Narrative: Therapeutic levels (1 mg/mL and 2 mg/mL) of hydroxocobalamin may interfere with the fCOHb and fMetHb where it may cause lower than expected values    Blood culture #2 [[id number]] Collected:  09/13/18 1600    Lab Status: In process Specimen:  Blood from Arm, Left Updated:  09/13/18 1604    Blood culture #1 [[id number]] Collected:  09/13/18 1552    Lab Status: In process Specimen:  Blood from Arm, Right Updated:  09/13/18 1557    Comprehensive metabolic panel [[id number]]  (Abnormal) Collected:  09/13/18 1510    Lab Status:  Final result Specimen:  Blood from Arm, Left Updated:  09/13/18 1541     Sodium 136 mmol/L      Potassium 4 4 mmol/L      Chloride 100 mmol/L      CO2 20 (L) mmol/L      ANION GAP 16 (H) mmol/L      BUN 25 mg/dL      Creatinine 1 77 (H) mg/dL      Glucose 335 (H) mg/dL      Calcium 9 3 mg/dL      AST 56 (H) U/L      ALT 82 (H) U/L      Alkaline Phosphatase 85 U/L      Total Protein 8 0 g/dL      Albumin 3 8 g/dL      Total Bilirubin 0 68 mg/dL      eGFR 40 ml/min/1 73sq m     Narrative:         National Kidney Disease Education Program recommendations are as follows:  GFR calculation is accurate only with a steady state creatinine  Chronic Kidney disease less than 60 ml/min/1 73 sq  meters  Kidney failure less than 15 ml/min/1 73 sq  meters      Troponin I [[id number]]  (Normal) Collected:  09/13/18 1510    Lab Status:  Final result Specimen:  Blood from Arm, Left Updated:  09/13/18 1538     Troponin I <0 02 ng/mL     CBC and differential [[id number]]  (Abnormal) Collected:  09/13/18 1510    Lab Status:  Final result Specimen:  Blood from Arm, Left Updated:  09/13/18 1520     WBC 18 51 (H) Thousand/uL      RBC 5 32 Million/uL      Hemoglobin 15 2 g/dL      Hematocrit 45 8 %      MCV 86 fL      MCH 28 6 pg      MCHC 33 2 g/dL      RDW 13 5 %      MPV 9 8 fL      Platelets 322 Thousands/uL      nRBC 0 /100 WBCs      Neutrophils Relative 86 (H) %      Immat GRANS % 1 %      Lymphocytes Relative 8 (L) %      Monocytes Relative 5 %      Eosinophils Relative 0 %      Basophils Relative 0 %      Neutrophils Absolute 15 75 (H) Thousands/µL      Immature Grans Absolute 0 22 (H) Thousand/uL      Lymphocytes Absolute 1 48 Thousands/µL      Monocytes Absolute 0 99 Thousand/µL      Eosinophils Absolute 0 02 Thousand/µL      Basophils Absolute 0 05 Thousands/µL                  CT head without contrast   Final Result by Tolu Viramontes MD (09/13 1824)      No acute intracranial abnormality  Workstation performed: [id number]         X-ray chest 2 views   Final Result by Anoop Khan MD (09/13 1541)      No acute cardiopulmonary disease  Workstation performed: [id number]               Procedures  Procedures      Phone Consults  ED Phone Contact    ED Course  ED Course as of Sep 13 2259   Thu Sep 13, 2018   1756 WBC: (!) 18 51   1757 Anion Gap: (!) 16   1757 Glucose: (!) 335                         Initial Sepsis Screening     Row Name 09/13/18 1539                Is the patient's history suggestive of a new or worsening infection? No  -JL        Suspected source of infection          Are two or more of the following signs & symptoms of infection both present and new to the patient? (!)  Yes (Proceed)  -JL        Indicate SIRS criteria Tachycardia > 90 bpm;Leukocytosis (WBC > 96527 IJL)  -JL        If the answer is yes to both questions, suspicion of sepsis is present          If severe sepsis is present AND tissue hypoperfusion perists in the hour after fluid resuscitation or lactate > 4, the patient meets criteria for SEPTIC SHOCK          Are any of the following organ dysfunction criteria present within 6 hours of suspected infection and SIRS criteria that are NOT considered to be chronic conditions?           Organ dysfunction          Date of presentation of severe sepsis          Time of presentation of severe sepsis          Tissue hypoperfusion persists in the hour after crystalloid fluid administration, evidenced, by either:          Was hypotension present within one hour of the conclusion of crystalloid fluid administration?         Date of presentation of septic shock          Time of presentation of septic shock            User Key  (r) = Recorded By, (t) = Taken By, (c) = Cosigned By    Initials Name Provider Type    Jo-Ann Roldan MD Resident          GRICELDA Risk Score      Most Recent Value   Age >= 72  0 Filed at: 09/13/2018 2147   Known CAD (stenosis >= 50%)  0 Filed at: 09/13/2018 2147   Recent (<=24 hrs) Service Angina  0 Filed at: 09/13/2018 2147   ST Deviation >= 0 5 mm  0 Filed at: 09/13/2018 2147   3+ CAD Risk Factors (FHx, HTN, HLP, DM, Smoker)  1 Filed at: 09/13/2018 2147   Aspirin Use Past 7 Days  0 Filed at: 09/13/2018 2147   Elevated Cardiac Markers  0 Filed at: 09/13/2018 2147   GRICELDA Risk Score (Calculated)  1 Filed at: 09/13/2018 2147              MDM  Number of Diagnoses or Management Options  GREG (acute kidney injury) ([facility] ):   Leukocytosis:   Diagnosis management comments: Patient was initially tachycardic complaining of some chest pain and a D-dimer was ordered  However, when the patient's white blood cell count returned elevated and he had an GREG the decision was made to not pursue a CT PE of the chest because his heart rate was likely secondary to the sepsis and we did not want to risk damaging his kidneys further with a dye load  Patient's septic workup was unremarkable and despite his anion gap of 16 and elevated blood sugar he did not have any acetone in his blood suggestive of DKA and his VBG showed a normal pH  Spoke with slim who agreed to admit the patient to their service as an inpatient      CritCare Time    Disposition  Final diagnoses:   GREG (acute kidney injury) ([facility] )   Leukocytosis     Time reflects when diagnosis was documented in both MDM as applicable and the Disposition within this note     Time User Action Codes Description Comment    9/13/2018  8:01 PM Meg Ouch [N17 9] GREG (acute kidney injury) ([facility] [address] )     9/13/2018  8:01 PM Meg Ouch [K41 [id number]] Leukocytosis     9/13/2018  9:58 PM Jerome Pion M Add [R45 851] Suicidal ideation     9/13/2018  9:58 PM Urbano Snuffer Add [R44 0] Auditory hallucinations     9/13/2018  9:58 PM Clement Snuffer Add [F43 10] PTSD (post-traumatic stress disorder)       ED Disposition     ED Disposition Condition Comment    Admit  Case was discussed with LICO and the patient's admission status was agreed to be Admission Status: inpatient status to the service of Dr Alycia Guillen   Follow-up Information    None         There are no discharge medications for this patient  No discharge procedures on file  ED Provider  Attending physically available and evaluated Zoila Us  MARCO managed the patient along with the ED Attending      Electronically Signed by         Marquis Blu MD  09/13/18 5083

## 2018-09-14 LAB
ALBUMIN SERPL BCP-MCNC: 3.2 G/DL (ref 3.5–5)
ALP SERPL-CCNC: 69 U/L (ref 46–116)
ALT SERPL W P-5'-P-CCNC: 73 U/L (ref 12–78)
ANION GAP SERPL CALCULATED.3IONS-SCNC: 11 MMOL/L (ref 4–13)
AST SERPL W P-5'-P-CCNC: 48 U/L (ref 5–45)
ATRIAL RATE: 125 BPM
BASOPHILS # BLD AUTO: 0.07 THOUSANDS/ΜL (ref 0–0.1)
BASOPHILS NFR BLD AUTO: 1 % (ref 0–1)
BILIRUB SERPL-MCNC: 0.33 MG/DL (ref 0.2–1)
BUN SERPL-MCNC: 28 MG/DL (ref 5–25)
CALCIUM SERPL-MCNC: 8.6 MG/DL (ref 8.3–10.1)
CHLORIDE SERPL-SCNC: 107 MMOL/L (ref 100–108)
CHOLEST SERPL-MCNC: 186 MG/DL (ref 50–200)
CO2 SERPL-SCNC: 24 MMOL/L (ref 21–32)
CREAT SERPL-MCNC: 1.29 MG/DL (ref 0.6–1.3)
EOSINOPHIL # BLD AUTO: 0.41 THOUSAND/ΜL (ref 0–0.61)
EOSINOPHIL NFR BLD AUTO: 3 % (ref 0–6)
ERYTHROCYTE [DISTWIDTH] IN BLOOD BY AUTOMATED COUNT: 13.7 % (ref 11.6–15.1)
EST. AVERAGE GLUCOSE BLD GHB EST-MCNC: 154 MG/DL
GFR SERPL CREATININE-BSD FRML MDRD: 59 ML/MIN/1.73SQ M
GLUCOSE SERPL-MCNC: 167 MG/DL (ref 65–140)
GLUCOSE SERPL-MCNC: 185 MG/DL (ref 65–140)
GLUCOSE SERPL-MCNC: 202 MG/DL (ref 65–140)
GLUCOSE SERPL-MCNC: 208 MG/DL (ref 65–140)
GLUCOSE SERPL-MCNC: 260 MG/DL (ref 65–140)
HBA1C MFR BLD: 7 % (ref 4.2–6.3)
HCT VFR BLD AUTO: 40.8 % (ref 36.5–49.3)
HDLC SERPL-MCNC: 23 MG/DL (ref 40–60)
HGB BLD-MCNC: 13.6 G/DL (ref 12–17)
IMM GRANULOCYTES # BLD AUTO: 0.08 THOUSAND/UL (ref 0–0.2)
IMM GRANULOCYTES NFR BLD AUTO: 1 % (ref 0–2)
LDLC SERPL CALC-MCNC: 104 MG/DL (ref 0–100)
LYMPHOCYTES # BLD AUTO: 4.21 THOUSANDS/ΜL (ref 0.6–4.47)
LYMPHOCYTES NFR BLD AUTO: 28 % (ref 14–44)
MCH RBC QN AUTO: 29 PG (ref 26.8–34.3)
MCHC RBC AUTO-ENTMCNC: 33.3 G/DL (ref 31.4–37.4)
MCV RBC AUTO: 87 FL (ref 82–98)
MONOCYTES # BLD AUTO: 1.11 THOUSAND/ΜL (ref 0.17–1.22)
MONOCYTES NFR BLD AUTO: 7 % (ref 4–12)
NEUTROPHILS # BLD AUTO: 9.13 THOUSANDS/ΜL (ref 1.85–7.62)
NEUTS SEG NFR BLD AUTO: 60 % (ref 43–75)
NRBC BLD AUTO-RTO: 0 /100 WBCS
P AXIS: 65 DEGREES
PLATELET # BLD AUTO: 308 THOUSANDS/UL (ref 149–390)
PMV BLD AUTO: 10.3 FL (ref 8.9–12.7)
POTASSIUM SERPL-SCNC: 3.6 MMOL/L (ref 3.5–5.3)
PR INTERVAL: 134 MS
PROT SERPL-MCNC: 6.8 G/DL (ref 6.4–8.2)
QRS AXIS: 80 DEGREES
QRSD INTERVAL: 98 MS
QT INTERVAL: 322 MS
QTC INTERVAL: 464 MS
RBC # BLD AUTO: 4.69 MILLION/UL (ref 3.88–5.62)
SODIUM SERPL-SCNC: 142 MMOL/L (ref 136–145)
T WAVE AXIS: 40 DEGREES
TRIGL SERPL-MCNC: 297 MG/DL
TROPONIN I SERPL-MCNC: <0.02 NG/ML
VENTRICULAR RATE: 125 BPM
WBC # BLD AUTO: 15.01 THOUSAND/UL (ref 4.31–10.16)

## 2018-09-14 PROCEDURE — 99253 IP/OBS CNSLTJ NEW/EST LOW 45: CPT | Performed by: PSYCHIATRY & NEUROLOGY

## 2018-09-14 PROCEDURE — 93005 ELECTROCARDIOGRAM TRACING: CPT

## 2018-09-14 PROCEDURE — 99232 SBSQ HOSP IP/OBS MODERATE 35: CPT | Performed by: INTERNAL MEDICINE

## 2018-09-14 PROCEDURE — 80053 COMPREHEN METABOLIC PANEL: CPT | Performed by: PHYSICIAN ASSISTANT

## 2018-09-14 PROCEDURE — 82948 REAGENT STRIP/BLOOD GLUCOSE: CPT

## 2018-09-14 PROCEDURE — 80061 LIPID PANEL: CPT | Performed by: PHYSICIAN ASSISTANT

## 2018-09-14 PROCEDURE — 84484 ASSAY OF TROPONIN QUANT: CPT | Performed by: PHYSICIAN ASSISTANT

## 2018-09-14 PROCEDURE — 85025 COMPLETE CBC W/AUTO DIFF WBC: CPT | Performed by: PHYSICIAN ASSISTANT

## 2018-09-14 PROCEDURE — 93010 ELECTROCARDIOGRAM REPORT: CPT | Performed by: INTERNAL MEDICINE

## 2018-09-14 RX ORDER — ALBUTEROL SULFATE 90 UG/1
2 AEROSOL, METERED RESPIRATORY (INHALATION) EVERY 4 HOURS
Status: ON HOLD | COMMUNITY
Start: 2018-08-15 | End: 2018-09-14 | Stop reason: SDUPTHER

## 2018-09-14 RX ORDER — LORAZEPAM 2 MG/ML
1 INJECTION INTRAMUSCULAR EVERY 6 HOURS PRN
Status: DISCONTINUED | OUTPATIENT
Start: 2018-09-14 | End: 2018-09-17

## 2018-09-14 RX ORDER — ALBUTEROL SULFATE 90 UG/1
2 AEROSOL, METERED RESPIRATORY (INHALATION) EVERY 6 HOURS PRN
Status: ON HOLD | COMMUNITY
End: 2018-09-20

## 2018-09-14 RX ORDER — OLANZAPINE 5 MG/1
5 TABLET ORAL
Status: DISCONTINUED | OUTPATIENT
Start: 2018-09-14 | End: 2018-09-17 | Stop reason: HOSPADM

## 2018-09-14 RX ADMIN — ASPIRIN 81 MG 81 MG: 81 TABLET ORAL at 08:55

## 2018-09-14 RX ADMIN — LORAZEPAM 1 MG: 2 INJECTION INTRAMUSCULAR; INTRAVENOUS at 15:17

## 2018-09-14 RX ADMIN — INSULIN LISPRO 1 UNITS: 100 INJECTION, SOLUTION INTRAVENOUS; SUBCUTANEOUS at 17:59

## 2018-09-14 RX ADMIN — NITROGLYCERIN 0.4 MG: 0.4 TABLET SUBLINGUAL at 21:35

## 2018-09-14 RX ADMIN — SODIUM CHLORIDE 125 ML/HR: 0.9 INJECTION, SOLUTION INTRAVENOUS at 22:06

## 2018-09-14 RX ADMIN — INSULIN LISPRO 2 UNITS: 100 INJECTION, SOLUTION INTRAVENOUS; SUBCUTANEOUS at 21:25

## 2018-09-14 RX ADMIN — INSULIN LISPRO 3 UNITS: 100 INJECTION, SOLUTION INTRAVENOUS; SUBCUTANEOUS at 12:14

## 2018-09-14 RX ADMIN — SODIUM CHLORIDE 125 ML/HR: 0.9 INJECTION, SOLUTION INTRAVENOUS at 06:34

## 2018-09-14 RX ADMIN — HEPARIN SODIUM 5000 UNITS: 5000 INJECTION INTRAVENOUS; SUBCUTANEOUS at 06:27

## 2018-09-14 RX ADMIN — HEPARIN SODIUM 5000 UNITS: 5000 INJECTION INTRAVENOUS; SUBCUTANEOUS at 21:25

## 2018-09-14 RX ADMIN — INSULIN LISPRO 1 UNITS: 100 INJECTION, SOLUTION INTRAVENOUS; SUBCUTANEOUS at 08:54

## 2018-09-14 RX ADMIN — HEPARIN SODIUM 5000 UNITS: 5000 INJECTION INTRAVENOUS; SUBCUTANEOUS at 13:54

## 2018-09-14 RX ADMIN — OLANZAPINE 5 MG: 5 TABLET, FILM COATED ORAL at 21:25

## 2018-09-14 RX ADMIN — NICOTINE 1 PATCH: 14 PATCH, EXTENDED RELEASE TRANSDERMAL at 08:55

## 2018-09-14 NOTE — ASSESSMENT & PLAN NOTE
May be 2* methamphetamine use, pt does have RF for ACS however w/ boyd score of 1  ekg nsr and nonischemic, cxr w/o widened mediastinum  Will give asa 325mg once, trend troponins/ekgs, check hgb a1c and lipid panel

## 2018-09-14 NOTE — ASSESSMENT & PLAN NOTE
By sinus tachycardia/leukocytosis POA  May be secondary to acute methamphetamine use, no s/sx of acute infection  Monitor closely off abx

## 2018-09-14 NOTE — PROGRESS NOTES
[facility] Progress Note  Patient: Radha Rain 59 y o  male   MRN: [id number]  PCP: No primary care provider on file  Unit/Bed#: [facility] Encounter: [id number]  Date Of Visit: 09/14/18    Assessment:  Patient presented as a 72-year-old male with history of PTSD BM more  [facility] living with family members for last 7 years with described usage of methamphetamine on weekends but on last week or so became disabled from a hamstring injury was going to physical therapy was having pain issues and decided to binge on methamphetamine also in relation to his ongoing depression over his living environment describes hallucinations as constantly feeling some was going to kill him since there is" a gun in the house someone will blow my had off  Also sees snakes constantly chasing him and convince he is about to die    He continues to deny suicidal ideation    Principal Problem:    Methamphetamine use  Active Problems:    SIRS (systemic inflammatory response syndrome) (HCC)    Elevated d-dimer    Suicidal ideation    Metabolic acidosis    GREG (acute kidney injury) ([facility] [address] )    Diabetes (HCC)    Auditory hallucinations    Chest pain      Plan:    · Methamphetamine use on a chronic basis and in active withdrawal with extreme anxiety/depression and hallucinations with paranoid thought does not appear to be suicidal but extremely paranoid with psychosis from drug usage will need to sedate with benzodiazepines and antipsychotics awaiting Psychiatry to see evaluate and treat will continue one-to-one observation  · History of PTSD, had been treated at [facility] lost to follow-up needs to be treated  · Chest pain and myalgias believe in relation to his drug withdrawal CPK elevation in D-dimer I believe are related to dehydration and rhabdomyolysis and not PE  · Metabolic acidosis resolved with IV hydration along with acute kidney injury will continue need IV hydration  · Rhabdomyolysis will continue to hydrate      VTE Pharmacologic Prophylaxis:   Pharmacologic: Heparin  Mechanical VTE Prophylaxis in Place: No refused by patient    Discussions with Specialists or Other Care Team Provider:   No    Time Spent for Care: 45 minutes  More than 50% of total time spent on counseling and coordination of care as described above  Subjective:   Tearful despondent over his living situation and reason he is here wants to get better continues to deny any thoughts of hurting himself but is convince some months trying to kill him or things such as snakesis constantly chasing him  Extremely anxious and fearful with impending doom and anxiety convince she also has chest pain short of breath but not hourly dyspneic but has twitching of upper and lower extremities    Objective:     Vitals:   Temp (24hrs), Av 3 °F (36 8 °C), Min:97 9 °F (36 6 °C), Max:98 7 °F (37 1 °C)    HR:  [] 85  Resp:  [16-20] 18  BP: (114-141)/(53-82) 116/67  SpO2:  [94 %-100 %] 94 %  There is no height or weight on file to calculate BMI  Input and Output Summary (last 24 hours):        Intake/Output Summary (Last 24 hours) at 18 1433  Last data filed at 18 9648   Gross per 24 hour   Intake          2941 67 ml   Output              300 ml   Net          2641 67 ml       Physical Exam:     Physical Exam:   General appearance: alert, delirious, moderate distress, appears stated age and cooperative  Head: Normocephalic, without obvious abnormality, atraumatic  Lungs: clear to auscultation bilaterally  Heart: regular rate and rhythm  Abdomen: soft, non-tender; bowel sounds normal; no masses,  no organomegaly  Back: negative  Extremities: Tremulous  Neurologic: Mental status: Alert, oriented, thought content appropriate, affect: constricted, increased in intensity and labile, thought content exhibits tangential connections, flight of ideas      Additional Data:     Labs:      Results from last 7 days  Lab Units 09/14/18  0434   WBC Thousand/uL 15 01*   HEMOGLOBIN g/dL 13 6   HEMATOCRIT % 40 8   PLATELETS Thousands/uL 308   NEUTROS PCT % 60   LYMPHS PCT % 28   MONOS PCT % 7   EOS PCT % 3       Results from last 7 days  Lab Units 09/14/18  0434   SODIUM mmol/L 142   POTASSIUM mmol/L 3 6   CHLORIDE mmol/L 107   CO2 mmol/L 24   BUN mg/dL 28*   CREATININE mg/dL 1 29   CALCIUM mg/dL 8 6   ALK PHOS U/L 69   ALT U/L 73   AST U/L 48*       Results from last 7 days  Lab Units 09/13/18  1552   INR  1 04       * I Have Reviewed All Lab Data Listed Above  * Additional Pertinent Lab Tests Reviewed: All Labs For Current Hospital Admission Reviewed    Imaging:  X-ray Chest 2 Views    Result Date: 9/13/2018  Narrative: CHEST INDICATION:   chest pain  COMPARISON:  None EXAM PERFORMED/VIEWS:  XR CHEST PA & LATERAL FINDINGS: Cardiomediastinal silhouette appears unremarkable  The lungs are clear  No pneumothorax or pleural effusion  Osseous structures appear within normal limits for patient age  Impression: No acute cardiopulmonary disease  Workstation performed: [id number]     Ct Head Without Contrast    Result Date: 9/13/2018  Narrative: CT BRAIN - WITHOUT CONTRAST INDICATION:   altered mental status  22 COMPARISON:  None  TECHNIQUE:  CT examination of the brain was performed  In addition to axial images, coronal 2D reformatted images were created and submitted for interpretation  Radiation dose length product (DLP) for this visit:  1031 mGy-cm   This examination, like all CT scans performed in the [facility], was performed utilizing techniques to minimize radiation dose exposure, including the use of iterative reconstruction and automated exposure control  IMAGE QUALITY:  Diagnostic  FINDINGS: PARENCHYMA:  No intracranial mass, mass effect or midline shift  No CT signs of acute infarction  No acute parenchymal hemorrhage  VENTRICLES AND EXTRA-AXIAL SPACES:  Normal for the patient's age  VISUALIZED ORBITS AND PARANASAL SINUSES:  No acute abnormality involving the orbits  Mild scattered sinus mucosal thickening is noted  No fluid levels are seen  CALVARIUM AND EXTRACRANIAL SOFT TISSUES:  Normal      Impression: No acute intracranial abnormality  Workstation performed: [id number]     Imaging Reports Reviewed Today Include:   Imaging Personally Reviewed by Myself Includes:    Procedure: X-ray Chest 2 Views    Result Date: 9/13/2018  Narrative: CHEST INDICATION:   chest pain  COMPARISON:  None EXAM PERFORMED/VIEWS:  XR CHEST PA & LATERAL FINDINGS: Cardiomediastinal silhouette appears unremarkable  The lungs are clear  No pneumothorax or pleural effusion  Osseous structures appear within normal limits for patient age  Impression: No acute cardiopulmonary disease  Workstation performed: [id number]     Procedure: Ct Head Without Contrast    Result Date: 9/13/2018  Narrative: CT BRAIN - WITHOUT CONTRAST INDICATION:   altered mental status  22 COMPARISON:  None  TECHNIQUE:  CT examination of the brain was performed  In addition to axial images, coronal 2D reformatted images were created and submitted for interpretation  Radiation dose length product (DLP) for this visit:  1031 mGy-cm   This examination, like all CT scans performed in the [facility], was performed utilizing techniques to minimize radiation dose exposure, including the use of iterative reconstruction and automated exposure control  IMAGE QUALITY:  Diagnostic  FINDINGS: PARENCHYMA:  No intracranial mass, mass effect or midline shift  No CT signs of acute infarction  No acute parenchymal hemorrhage  VENTRICLES AND EXTRA-AXIAL SPACES:  Normal for the patient's age  VISUALIZED ORBITS AND PARANASAL SINUSES:  No acute abnormality involving the orbits  Mild scattered sinus mucosal thickening is noted  No fluid levels are seen   CALVARIUM AND EXTRACRANIAL SOFT TISSUES:  Normal      Impression: No acute intracranial abnormality  Workstation performed: [id number]        Recent Cultures (last 7 days):           Last 24 Hours Medication List:     Current Facility-Administered Medications:  aspirin 81 mg Oral Daily Mimi Mesa PA-C    heparin (porcine) 5,000 Units Subcutaneous [facility] Mimi Mesa PA-C    hydrALAZINE 10 mg Intravenous Q6H PRN Mimi Mesa PA-C    insulin lispro 1-6 Units Subcutaneous 4x Daily (AC & HS) Mimi Mesa PA-C    LORazepam 1 mg Intravenous Q4H PRN Mimi Mesa PA-C    LORazepam 1 mg Intravenous Q6H PRN Archie Garg MD    nicotine 1 patch Transdermal Daily Mimi Mesa PA-C    nitroglycerin 0 4 mg Sublingual Q5 Min PRN Mimi Mesa PA-C    OLANZapine 5 mg Oral HS Archie Garg MD    ondansetron 4 mg Intravenous Q6H PRN Mimi Mesa PA-C    sodium chloride 125 mL/hr Intravenous Continuous Mimi Mesa PA-C Last Rate: 125 mL/hr (09/14/18 8666)        Today, Patient Was Seen By: Archie Garg MD    ** Please Note: Dragon 360 Dictation voice to text software may have been used in the creation of this document   **

## 2018-09-14 NOTE — ASSESSMENT & PLAN NOTE
Auditory and visual hallucinations suspect 2* methamphetamine use  No hx of alcohol abuse, will appreciate psych recommendations

## 2018-09-14 NOTE — ASSESSMENT & PLAN NOTE
Lab Results   Component Value Date    HGBA1C 7 0 (H) 05/16/2018       No results for input(s): POCGLU in the last 72 hours      Blood Sugar Average: Last 72 hrs:     Seems relatively controlled from recent hgb I7S  Uncertain what current meds pt is on, nursing to reach out w/sister with whom pt lives  Start SSI and POC BS

## 2018-09-14 NOTE — PLAN OF CARE
CARDIOVASCULAR - ADULT     Maintains optimal cardiac output and hemodynamic stability Progressing     Absence of cardiac dysrhythmias or at baseline rhythm Progressing        DISCHARGE PLANNING     Discharge to home or other facility with appropriate resources Progressing        Knowledge Deficit     Patient/family/caregiver demonstrates understanding of disease process, treatment plan, medications, and discharge instructions Progressing        METABOLIC, FLUID AND ELECTROLYTES - ADULT     Electrolytes maintained within normal limits Progressing     Fluid balance maintained Progressing     Glucose maintained within target range Progressing        NEUROSENSORY - ADULT     Achieves stable or improved neurological status Progressing     Absence of seizures Progressing     Remains free of injury related to seizures activity Progressing     Achieves maximal functionality and self care Progressing        Potential for Falls     Patient will remain free of falls Progressing        RESPIRATORY - ADULT     Achieves optimal ventilation and oxygenation Progressing        SAFETY ADULT     Maintain or return to baseline ADL function Progressing     Maintain or return mobility status to optimal level Progressing

## 2018-09-14 NOTE — ASSESSMENT & PLAN NOTE
Mildly elevated D-dimer and patient with suspected rhabdomyolysis from methamphetamine use and GREG  Unfortunately pt does have recent travel (), sob and cp in the setting of prior asbestos exposure and smoker, difficult to entirely r/o PE  ekg w/o evidence of heart strain, no LE edema wells criteria for pe low moderate risk  Will repeat bmp in am and if GREG improved would be candidate for CTA PE study to confirm vs NM V/Q scan  Defer AC at this time as this is not #1 diagnosis

## 2018-09-14 NOTE — ASSESSMENT & PLAN NOTE
2* dehydration and starvation ketoacidosis from poor intake vs rhabdo from methamphetamine  Bmp w/hyaline casts small 'blood' but only 0-1 rbcs on microscopy bladder scan was 76cc  Continue ivf hydration repeat bmp in am

## 2018-09-14 NOTE — ASSESSMENT & PLAN NOTE
In setting of depression and ptsd suspect as component of dual diagnosis  No evidence of agitation pt is pleasant and cooperative, uds unremarkable otherwise, coma panel negative  Monitor for acs rule out, start seizure precautions, check CK to r/o rhabdo  Consult CM for host

## 2018-09-14 NOTE — CASE MANAGEMENT
Initial Clinical Review    Admission: Date/Time/Statement: 9/13/18 @ 2004     Orders Placed This Encounter   Procedures    Inpatient Admission (expected length of stay for this patient is greater than two midnights)     Standing Status:   Standing     Number of Occurrences:   1     Order Specific Question:   Admitting Physician     Answer:   Sola Louis [[id number]]     Order Specific Question:   Level of Care     Answer:   Med Surg [[id number]]     Order Specific Question:   Estimated length of stay     Answer:   More than 2 Midnights     Order Specific Question:   Certification     Answer:   I certify that inpatient services are medically necessary for this patient for a duration of greater than two midnights  See H&P and MD Progress Notes for additional information about the patient's course of treatment  ED: Date/Time/Mode of Arrival:   ED Arrival Information     Expected Arrival Acuity Means of Arrival Escorted By Service Admission Type    - 9/13/2018 14:57 Urgent Ambulance [facility] General Medicine Urgent    Arrival Complaint    overdose        Chief Complaint:   Chief Complaint   Patient presents with    Recreational Drug Use     Pt states taking "speed" aka "jihan" on Monday and has been hallucinating since Tuesday  Pt states he sees and feels like someone is going to shoot him in the head  Pt also states he has been unable to urinate since Tuesday  Pt c/io left sided Chest tightness and shortness of breath that started today  History of Illness:  59-year-old male with a past medical history hypertension, hyperlipidemia, and diabetes who presents to the emergency department this afternoon with visual hallucinations since Monday  Patient states that he has been snorting methamphetamine since Monday and ran out last night  He states that prior to the methamphetamine he was not having any hallucinations and he felt his normal self    After snorting methamphetamine the patient states that he feels like someone is sneaking up behind him in order to shoot him and that snakes were chasing him  Patient also endorses having suicidal ideations over the past couple days but denies any current are active suicidal ideations  Patient states that he has been on disability for the past four weeks since he tore his hamstring on a fishing trip  He usually works as a   Patient states that he typically takes medications for his high blood pressure and diabetes but he has an over the past couple days since he has been doing meth  Patient endorses having left-sided chest pain but no associated diaphoresis, shortness of breath, nausea, vomiting, or radiation of the pain  He denies any prior heart history     wheezes    tenderness ( suprapubic)  ED Vital Signs:   ED Triage Vitals   Temperature Pulse Respirations Blood Pressure SpO2   09/13/18 1547 09/13/18 1547 09/13/18 1547 09/13/18 1547 09/13/18 1547   98 2 °F (36 8 °C) (!) 130 20 129/70 96 %      Temp Source Heart Rate Source Patient Position - Orthostatic VS BP Location FiO2 (%)   09/13/18 1547 09/13/18 1547 09/13/18 1547 09/13/18 1547 --   Oral Monitor Lying Right arm       Pain Score       09/13/18 2148       2        Wt Readings from Last 1 Encounters:   No data found for Wt     Vital Signs (abnormal):   09/13/18 1827 -- 100 18 118/62 99 % -- CD   09/13/18 1707 -- 100 18 114/53 100 % --    09/13/18 1547 98 2 °F (36 8 °C)  130 20 129/70 96 % --        Abnormal Labs/Diagnostic Test Results:   WBC's 18 51,   ANC 15 75;       BC's pending  CO2  20,  A gap 16,   Cr 1 77,   Glu 335,   AST 56,   ALT 82    D-Dimer 685  Venous gas:  7 430 / 30 9 / 55 9 / 86 7 %,  Bicarb 20  Rapid Drug:  + amphetamines  Urine:  >=300 protein,  Glu 250,   2+ ketones,  Small blood  CT Head: No acute intracranial abnormality    CXR: No acute cardiopulmonary disease    ED Treatment:   Medication Administration from 09/13/2018 4167 to 09/13/2018 0780       Date/Time Order Dose Route Action Action by Comments     09/13/2018 1702 sodium chloride 0 9 % bolus 1,000 mL 0 mL Intravenous Stopped       09/13/2018 1600 sodium chloride 0 9 % bolus 1,000 mL 1,000 mL Intravenous New Bag       09/13/2018 1703 albuterol inhalation solution 5 mg 5 mg Nebulization Given       09/13/2018 1703 ipratropium (ATROVENT) 0 02 % inhalation solution 0 5 mg 0 5 mg Nebulization Given          Past Medical/Surgical History:   Past Medical History:   Diagnosis Date    Asbestos exposure     Diabetes mellitus ([facility] [address] )     Drug use     Hypertension     Lung disease        Admitting Diagnosis: Leukocytosis [D72 829]  Recreational drug use [F19 90]  GREG (acute kidney injury) ([facility] [address] ) [N17 9]    Age/Sex: 59 y o  male    Assessment/Plan:  59 y o  male who presents with PMH of hypertension, diabetes,  and history PTSD and meth use coming to the hospital for methamphetamine use chest pain and poor oral intake over the last 4 days  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 '  he normally keeps himself busy by being a  however he has been on short-term disability after an injury to his hamstring for the last month and started using methamphetamine over the last 4 days  He reports that he has been having auditory and visual hallucinations  Admitted with  Chest Pain, May be 2* methamphetamine use, pt does have RF for ACS however w/ boyd score of 1   Will give asa 325mg once, trend troponins/ekgs, check hgb a1c and lipid panel  Methamphetamine use: start seizure precautions, check CK to r/o rhabdo  Consult Psych for Auditory hallucinations - Pt w/hx of ptsd and depression and has been hospitalized prior, Pt does admit to suicidal ideation w/o plan the last 2-3 days but denies intentional  overdose or any concomitant meds w/methampetamine use  1:1 observation consult psych  Acute Kidnay Injury 2* dehydration and starvation ketoacidosis from poor intake vs rhabdo from methamphetamine - Continue ivf hydration repeat bmp in am  Mildly elevated D-dimer and patient with suspected rhabdomyolysis from methamphetamine use and GREG  Will repeat bmp in am and if GREG improved would be candidate for CTA PE study to confirm vs NM V/Q scan  Metabolic Acidosis   2* starvation ketoacidosis +/- rhabdomyolysis from meth use and GREG  Monitor bmp on IVF hydration      Admission Orders:  IP TELE  Monitor 1:1  Seizure Precautions  EKG with 2nd & 3rd trops  EKG with CPor ST changes  Serial Trops  CMP, CBC, lipids  SCD's  Consult Psych    Scheduled Meds:   Current Facility-Administered Medications:  aspirin 81 mg Oral Daily     heparin (porcine) 5,000 Units Subcutaneous Q8H [address]             insulin lispro 1-6 Units Subcutaneous 4x Daily (AC & HS)             nicotine 1 patch Transdermal Daily                               Continuous Infusions:   sodium chloride 125 mL/hr Last Rate: 125 mL/hr (09/14/18 0634)     PRN Meds: hydrALAZINE    LORazepam    nitroglycerin    Ondansetron  C/O pain on Inspiration  9/14:  BUN 28,   Cr 1 29,   Glu 202,   AST 48  WBC's 15 01,   ANC 9 13    Thank you,  [address] Utilization Review Department  Phone: [phone]; Fax [phone]  ATTENTION: Please call with any questions or concerns to [phone]  and carefully follow the prompts so that you are directed to the right person  Send all requests for admission clinical reviews, approved or denied determinations and any other requests to fax [phone]   All voicemails are confidential
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
## 2018-09-14 NOTE — H&P
H&P- Sabi Barber 1955, 59 y o  male MRN: [id number]    Unit/Bed#: [place] [address] -01 Encounter: [id number]    Primary Care Provider: No primary care provider on file  Date and time admitted to hospital: 9/13/2018  2:57 PM      History and Physical - [facility] Internal Medicine    Patient Information: Sabi Barber 59 y o  male MRN: [id number]  Unit/Bed#: [facility] [id number] Encounter: [id number]  Admitting Physician: Jennifer Allison PA-C  PCP: No primary care provider on file    Date of Admission:  09/13/18    Assessment/Plan:    Hospital Problem List:     Principal Problem:    Methamphetamine use  Active Problems:    SIRS (systemic inflammatory response syndrome) (HCC)    Elevated d-dimer    Auditory hallucinations    Chest pain    Metabolic acidosis    GREG (acute kidney injury) ([facility] [address] )    Suicidal ideation    Diabetes ([facility] )        * Methamphetamine use   Assessment & Plan    In setting of depression and ptsd suspect as component of dual diagnosis  No evidence of agitation pt is pleasant and cooperative, uds unremarkable otherwise, coma panel negative  Monitor for acs rule out, start seizure precautions, check CK to r/o rhabdo  Consult CM for host        Chest pain   Assessment & Plan    May be 2* methamphetamine use, pt does have RF for ACS however w/ boyd score of 1  ekg nsr and nonischemic, cxr w/o widened mediastinum  Will give asa 325mg once, trend troponins/ekgs, check hgb a1c and lipid panel          Auditory hallucinations   Assessment & Plan    Auditory and visual hallucinations suspect 2* methamphetamine use  No hx of alcohol abuse, will appreciate psych recommendations        Elevated d-dimer   Assessment & Plan    Mildly elevated D-dimer and patient with suspected rhabdomyolysis from methamphetamine use and GREG  Unfortunately pt does have recent travel (), sob and cp in the setting of prior asbestos exposure and smoker, difficult to entirely r/o PE  ekg w/o evidence of heart strain, no LE edema wells criteria for pe low moderate risk  Will repeat bmp in am and if GREG improved would be candidate for CTA PE study to confirm vs NM V/Q scan  Defer AC at this time as this is not #1 diagnosis        SIRS (systemic inflammatory response syndrome) ([facility] [address] )   Assessment & Plan    By sinus tachycardia/leukocytosis POA  May be secondary to acute methamphetamine use, no s/sx of acute infection  Monitor closely off abx            GREG (acute kidney injury) ([facility] )   Assessment & Plan    2* dehydration and starvation ketoacidosis from poor intake vs rhabdo from methamphetamine  Bmp w/hyaline casts small 'blood' but only 0-1 rbcs on microscopy bladder scan was 76cc  Continue ivf hydration repeat bmp in am        Metabolic acidosis   Assessment & Plan    2* starvation ketoacidosis +/- rhabdomyolysis from meth use and GREG  Monitor bmp on IVF hydration        Diabetes ([facility] [address] )   Assessment & Plan    Lab Results   Component Value Date    HGBA1C 7 0 (H) 05/16/2018       No results for input(s): POCGLU in the last 72 hours  Blood Sugar Average: Last 72 hrs:     Seems relatively controlled from recent hgb X9Z  Uncertain what current meds pt is on, nursing to reach out w/sister with whom pt lives  Start SSI and POC BS        Suicidal ideation   Assessment & Plan    Pt w/hx of ptsd and depression and has been hospitalized prior   Pt does admit to suicidal ideation w/o plan the last 2-3 days but denies intentional overdose or any concomitant meds w/methampetamine use  1:1 observation consult psych          ·     VTE Prophylaxis: Heparin  / sequential compression device   Code Status: fc  POLST: There is no POLST form on file for this patient (pre-hospital)    Anticipated Length of Stay:  Patient will be admitted on an Inpatient basis with an anticipated length of stay of  Greater than 2 midnights     Justification for Hospital Stay: greg, methamphetamine use, acs r/o    Total Time for Visit, including Counseling / Coordination of Care: 45 minutes  Greater than 50% of this total time spent on direct patient counseling and coordination of care  Chief Complaint:   Chest pain, meth use, hallucinations no oral intake x4days    History of Present Illness:    Karen Crockett is a 59 y o  male who presents with PMH of hypertension, diabetes, specific closure and history PTSD and meth use coming to the hospital for methamphetamine use chest pain and poor oral intake over the last 4 days  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 '  he normally keeps himself busy by being a  however he has been on short-term disability after an injury to his hamstring for the last month and started using methamphetamine over the last 4 days  He reports that he has been having auditory and visual hallucinations  He describes that he hears voices although it is difficult to discern if there male or female or number were even quality of speech but the patient does note that Christopher Montgomery want to kill me "  He also reports that he has been seeing colors but denies seeing any other people shadows or hallucinations at his periphery  He reports that this today he was having chest pain which she reports is diffuse and described as tightness with associated shortness of breath and substernal and left-sided chest   He has difficulty describing how long the symptoms last with any positions make it better worse  He has a chronic cough productive of yellow sputum which is unchanged no fevers  He has no unilateral leg swelling  He does report that he frequently tries to look for the good things in life but does struggle with his mood and has been hospitalized before for suicide attempt    He initially denies suicidal ideation or self-injurious behaviors but reports that he has been having a poor relationship with his sister and nephews with whom he lives and reports that he needs to get out of that house due to concern for other drug related activity as he knows that it is not helping him with his current substance abuse  When asked why the patient is taking methamphetamine again he does multiple reports some suicidal ideation last several days but denies any plan denies any attempted overdose or any concomitant drugs use over-the-counter or illicit or prescription an attempt for suicide  No history of CAD or seizures  Patient is a diabetic and exposed to asbestos previously but does not remember what medications he is on currently  Review of Systems:    Review of Systems   Constitutional: Negative for chills and fever  HENT: Negative for congestion  Respiratory: Positive for shortness of breath  Cardiovascular: Positive for chest pain  Gastrointestinal: Negative for abdominal pain, nausea and vomiting  Neurological: Negative for light-headedness  Psychiatric/Behavioral: Positive for hallucinations and suicidal ideas  All other systems reviewed and are negative  Past Medical and Surgical History:     Past Medical History:   Diagnosis Date    Asbestos exposure     Diabetes mellitus ([facility] [address] )     Drug use     Hypertension     Lung disease        Past Surgical History:   Procedure Laterality Date    NO PAST SURGERIES         Meds/Allergies:    Prior to Admission medications    Not on File         Allergies: Allergies   Allergen Reactions    Penicillin V Hives    Penicillins        Social History:     Marital Status: Single   Occupation:   Patient Pre-hospital Living Situation:   Patient Pre-hospital Level of Mobility:   Patient Pre-hospital Diet Restrictions:   Substance Use History:   History   Alcohol Use No     History   Smoking Status    Current Every Day Smoker    Packs/day: 2 00   Smokeless Tobacco    Never Used     History   Drug Use    Types: Methamphetamines       Family History:    No family history on file      Physical Exam:     Vitals:   Blood Pressure: 130/77 (09/13/18 2147)  Pulse: 96 (09/13/18 2147)  Temperature: 98 2 °F (36 8 °C) (09/13/18 1547)  Temp Source: Oral (09/13/18 1547)  Respirations: 19 (09/13/18 2000)  SpO2: 96 % (09/13/18 2000)    Physical Exam   Constitutional: He appears well-developed  No distress  HENT:   Head: Normocephalic and atraumatic  Right Ear: External ear normal    Left Ear: External ear normal    Mucous membranes are bone dry   Eyes: Conjunctivae are normal  Right eye exhibits no discharge  Left eye exhibits no discharge  No scleral icterus  Miotic pupils with minimal reactivity to light   Cardiovascular: Normal rate, regular rhythm and normal heart sounds  Exam reveals no gallop and no friction rub  No murmur heard  Pulmonary/Chest: Effort normal  He has no wheezes  He has no rales  He exhibits no tenderness  Abdominal: Soft  He exhibits no distension  There is no tenderness  There is no rebound and no guarding  Musculoskeletal: He exhibits no edema  No posterior calf tenderness with palpation or Homans patient does have right hamstring tenderness with palpation     Neurological: He is alert  Skin: Skin is warm and dry  He is not diaphoretic  Psychiatric:   Tearful affect patient's mood is depressed  He does admit to occasional suicidal thoughts but denies plan or self injurious behaviors  Vitals reviewed  (  Be Sure to Include Physical Exam: Delete this entire line when you have entered your exam)    Additional Data:     Lab Results: I have personally reviewed pertinent reports          Results from last 7 days  Lab Units 09/13/18  1510   WBC Thousand/uL 18 51*   HEMOGLOBIN g/dL 15 2   HEMATOCRIT % 45 8   PLATELETS Thousands/uL 339   NEUTROS PCT % 86*   LYMPHS PCT % 8*   MONOS PCT % 5   EOS PCT % 0       Results from last 7 days  Lab Units 09/13/18  1510   SODIUM mmol/L 136   POTASSIUM mmol/L 4 4   CHLORIDE mmol/L 100   CO2 mmol/L 20*   BUN mg/dL 25   CREATININE mg/dL 1 77*   CALCIUM mg/dL 9 3   ALK PHOS U/L 85   ALT U/L 82*   AST U/L 56*       Results from last 7 days  Lab Units 09/13/18  1552   INR  1 04       Imaging: I have personally reviewed pertinent reports  X-ray Chest 2 Views    Result Date: 9/13/2018  Narrative: CHEST INDICATION:   chest pain  COMPARISON:  None EXAM PERFORMED/VIEWS:  XR CHEST PA & LATERAL FINDINGS: Cardiomediastinal silhouette appears unremarkable  The lungs are clear  No pneumothorax or pleural effusion  Osseous structures appear within normal limits for patient age  Impression: No acute cardiopulmonary disease  Workstation performed: [id number]     Ct Head Without Contrast    Result Date: 9/13/2018  Narrative: CT BRAIN - WITHOUT CONTRAST INDICATION:   altered mental status  22 COMPARISON:  None  TECHNIQUE:  CT examination of the brain was performed  In addition to axial images, coronal 2D reformatted images were created and submitted for interpretation  Radiation dose length product (DLP) for this visit:  1031 mGy-cm   This examination, like all CT scans performed in the [facility], was performed utilizing techniques to minimize radiation dose exposure, including the use of iterative reconstruction and automated exposure control  IMAGE QUALITY:  Diagnostic  FINDINGS: PARENCHYMA:  No intracranial mass, mass effect or midline shift  No CT signs of acute infarction  No acute parenchymal hemorrhage  VENTRICLES AND EXTRA-AXIAL SPACES:  Normal for the patient's age  VISUALIZED ORBITS AND PARANASAL SINUSES:  No acute abnormality involving the orbits  Mild scattered sinus mucosal thickening is noted  No fluid levels are seen  CALVARIUM AND EXTRACRANIAL SOFT TISSUES:  Normal      Impression: No acute intracranial abnormality  Workstation performed: [id number]       EKG, Pathology, and Other Studies Reviewed on Admission:   · EKG: nsr    Allscripts / Epic Records Reviewed: Yes     ** Please Note: This note has been constructed using a voice recognition system   **

## 2018-09-14 NOTE — ASSESSMENT & PLAN NOTE
Pt w/hx of ptsd and depression and has been hospitalized prior   Pt does admit to suicidal ideation w/o plan the last 2-3 days but denies intentional overdose or any concomitant meds w/methampetamine use  1:1 observation consult psych

## 2018-09-15 LAB
ANION GAP SERPL CALCULATED.3IONS-SCNC: 9 MMOL/L (ref 4–13)
BUN SERPL-MCNC: 15 MG/DL (ref 5–25)
CALCIUM SERPL-MCNC: 8.1 MG/DL (ref 8.3–10.1)
CHLORIDE SERPL-SCNC: 109 MMOL/L (ref 100–108)
CK MB SERPL-MCNC: 1.6 NG/ML (ref 0–5)
CK MB SERPL-MCNC: <1 % (ref 0–2.5)
CK SERPL-CCNC: 338 U/L (ref 39–308)
CO2 SERPL-SCNC: 24 MMOL/L (ref 21–32)
CREAT SERPL-MCNC: 0.96 MG/DL (ref 0.6–1.3)
GFR SERPL CREATININE-BSD FRML MDRD: 84 ML/MIN/1.73SQ M
GLUCOSE SERPL-MCNC: 155 MG/DL (ref 65–140)
GLUCOSE SERPL-MCNC: 181 MG/DL (ref 65–140)
GLUCOSE SERPL-MCNC: 191 MG/DL (ref 65–140)
GLUCOSE SERPL-MCNC: 243 MG/DL (ref 65–140)
GLUCOSE SERPL-MCNC: 250 MG/DL (ref 65–140)
POTASSIUM SERPL-SCNC: 3.8 MMOL/L (ref 3.5–5.3)
SODIUM SERPL-SCNC: 142 MMOL/L (ref 136–145)

## 2018-09-15 PROCEDURE — 82553 CREATINE MB FRACTION: CPT | Performed by: INTERNAL MEDICINE

## 2018-09-15 PROCEDURE — 99232 SBSQ HOSP IP/OBS MODERATE 35: CPT | Performed by: INTERNAL MEDICINE

## 2018-09-15 PROCEDURE — 82550 ASSAY OF CK (CPK): CPT | Performed by: INTERNAL MEDICINE

## 2018-09-15 PROCEDURE — 82948 REAGENT STRIP/BLOOD GLUCOSE: CPT

## 2018-09-15 PROCEDURE — 80048 BASIC METABOLIC PNL TOTAL CA: CPT | Performed by: INTERNAL MEDICINE

## 2018-09-15 RX ADMIN — SODIUM CHLORIDE 125 ML/HR: 0.9 INJECTION, SOLUTION INTRAVENOUS at 15:20

## 2018-09-15 RX ADMIN — HEPARIN SODIUM 5000 UNITS: 5000 INJECTION INTRAVENOUS; SUBCUTANEOUS at 12:39

## 2018-09-15 RX ADMIN — NICOTINE 1 PATCH: 14 PATCH, EXTENDED RELEASE TRANSDERMAL at 08:21

## 2018-09-15 RX ADMIN — HEPARIN SODIUM 5000 UNITS: 5000 INJECTION INTRAVENOUS; SUBCUTANEOUS at 21:10

## 2018-09-15 RX ADMIN — INSULIN LISPRO 3 UNITS: 100 INJECTION, SOLUTION INTRAVENOUS; SUBCUTANEOUS at 12:38

## 2018-09-15 RX ADMIN — INSULIN LISPRO 3 UNITS: 100 INJECTION, SOLUTION INTRAVENOUS; SUBCUTANEOUS at 17:21

## 2018-09-15 RX ADMIN — HEPARIN SODIUM 5000 UNITS: 5000 INJECTION INTRAVENOUS; SUBCUTANEOUS at 06:33

## 2018-09-15 RX ADMIN — SODIUM CHLORIDE 125 ML/HR: 0.9 INJECTION, SOLUTION INTRAVENOUS at 06:06

## 2018-09-15 RX ADMIN — OLANZAPINE 5 MG: 5 TABLET, FILM COATED ORAL at 21:10

## 2018-09-15 RX ADMIN — INSULIN LISPRO 2 UNITS: 100 INJECTION, SOLUTION INTRAVENOUS; SUBCUTANEOUS at 21:10

## 2018-09-15 RX ADMIN — ASPIRIN 81 MG 81 MG: 81 TABLET ORAL at 08:20

## 2018-09-15 RX ADMIN — INSULIN LISPRO 1 UNITS: 100 INJECTION, SOLUTION INTRAVENOUS; SUBCUTANEOUS at 08:20

## 2018-09-15 NOTE — PROGRESS NOTES
[facility] Progress Note  Patient: Franko Medina 59 y o  male   MRN: [id number]  PCP: No primary care provider on file  Unit/Bed#: [facility] 2 -01 Encounter: [id number]  Date Of Visit: 09/15/18    Assessment:  Patient presented as a 76-year-old male with history of PTSD BM more  [facility] living with family members for last 7 years with described usage of methamphetamine on weekends but on last week or so became disabled from a hamstring injury was going to physical therapy was having pain issues and decided to binge on methamphetamine also in relation to his ongoing depression over his living environment describes hallucinations as constantly feeling some was going to kill him since there is" a gun in the house someone will blow my had off  Also sees snakes constantly chasing him and convince he is about to die    He has has expressed suicidal ideation to the behavioral health consult also will remain one-to-one until psychiatric admission    Principal Problem:    Methamphetamine use  Active Problems:    SIRS (systemic inflammatory response syndrome) (HCC)    Elevated d-dimer    Suicidal ideation    Metabolic acidosis    GREG (acute kidney injury) ([facility] [address] )    Diabetes (HCC)    Auditory hallucinations    Chest pain      Plan:    · Methamphetamine use on a chronic basis and in active withdrawal with extreme anxiety/depression and hallucinations with paranoid thought does appear to be suicidal  extremely paranoid with psychosis from drug usage will need to sedate with benzodiazepines and antipsychotics  Psychiatry to see evaluate for inpatient psych admission and will sign 201 and treat will continue one-to-one observation  · History of PTSD, had been treated at [place] lost to follow-up needs to be treated and awaiting inpatient psychiatric admission at this point is medically stable for admission to a psych facility once bed available  · Chest pain and myalgias believe in relation to his drug withdrawal CPK elevation in D-dimer I believe are related to dehydration and rhabdomyolysis and not PE  · Metabolic acidosis resolved with IV hydration along with acute kidney injury and rhabdomyolysis has resolved stable for discharge to psych facility  · Rhabdomyolysis will continue to hydrate  · Type 2 diabetes mellitus will continue on sliding scale coverage only for now check hemoglobin A1c noted  level of 7 0 not sure was being treated would probably be okay with metformin once acute kidney injury resolves      VTE Pharmacologic Prophylaxis:   Pharmacologic: Heparin  Mechanical VTE Prophylaxis in Place: No refused by patient    Discussions with Specialists or Other Care Team Provider:   No    Time Spent for Care: 45 minutes  More than 50% of total time spent on counseling and coordination of care as described above  Subjective:   Tearful despondent over his living situation and reason he is here wants to get better continues to deny any thoughts of hurting himself but is convince some months trying to kill him or things such as snakesis constantly chasing him  Extremely anxious and fearful with impending doom and anxiety convince she also has chest pain short of breath but not hourly dyspneic but has twitching of upper and lower extremities/at after accepting and signing 201 patient is having 2nd thoughts but assured him I cannot discharge him he on a safe discharge plan with what he stated to us regarding the gun at home and is fears of dying either hurting himself or others  Complain of leg pains    Objective:     Vitals:   Temp (24hrs), Av 2 °F (36 2 °C), Min:96 7 °F (35 9 °C), Max:97 6 °F (36 4 °C)    HR:  [67-82] 67  Resp:  [18-20] 18  BP: (119-123)/(69-74) 119/74  SpO2:  [97 %] 97 %  There is no height or weight on file to calculate BMI  Input and Output Summary (last 24 hours):        Intake/Output Summary (Last 24 hours) at 09/15/18 1342  Last data filed at 09/14/18 2203   Gross per 24 hour   Intake          2409 17 ml   Output                0 ml   Net          2409 17 ml       Physical Exam:     Physical Exam:   General appearance: alert, delirious, moderate distress, appears stated age and cooperative  Head: Normocephalic, without obvious abnormality, atraumatic  Lungs: clear to auscultation bilaterally  Heart: regular rate and rhythm  Abdomen: soft, non-tender; bowel sounds normal; no masses,  no organomegaly  Back: negative  Extremities: Tremulous  Neurologic: Mental status: Alert, oriented, thought content appropriate, affect: constricted, increased in intensity and labile, thought content exhibits tangential connections, flight of ideas      Additional Data:     Labs:      Results from last 7 days  Lab Units 09/14/18  0434   WBC Thousand/uL 15 01*   HEMOGLOBIN g/dL 13 6   HEMATOCRIT % 40 8   PLATELETS Thousands/uL 308   NEUTROS PCT % 60   LYMPHS PCT % 28   MONOS PCT % 7   EOS PCT % 3       Results from last 7 days  Lab Units 09/15/18  0453 09/14/18  0434   SODIUM mmol/L 142 142   POTASSIUM mmol/L 3 8 3 6   CHLORIDE mmol/L 109* 107   CO2 mmol/L 24 24   BUN mg/dL 15 28*   CREATININE mg/dL 0 96 1 29   CALCIUM mg/dL 8 1* 8 6   ALK PHOS U/L  --  69   ALT U/L  --  73   AST U/L  --  48*       Results from last 7 days  Lab Units 09/13/18  1552   INR  1 04       * I Have Reviewed All Lab Data Listed Above  * Additional Pertinent Lab Tests Reviewed: All Labs For Current Hospital Admission Reviewed    Imaging:  X-ray Chest 2 Views    Result Date: 9/13/2018  Narrative: CHEST INDICATION:   chest pain  COMPARISON:  None EXAM PERFORMED/VIEWS:  XR CHEST PA & LATERAL FINDINGS: Cardiomediastinal silhouette appears unremarkable  The lungs are clear  No pneumothorax or pleural effusion  Osseous structures appear within normal limits for patient age  Impression: No acute cardiopulmonary disease   Workstation performed: [id number]     Ct Head Without Contrast    Result Date: 9/13/2018  Narrative: CT BRAIN - WITHOUT CONTRAST INDICATION:   altered mental status  22 COMPARISON:  None  TECHNIQUE:  CT examination of the brain was performed  In addition to axial images, coronal 2D reformatted images were created and submitted for interpretation  Radiation dose length product (DLP) for this visit:  1031 mGy-cm   This examination, like all CT scans performed in the [facility], was performed utilizing techniques to minimize radiation dose exposure, including the use of iterative reconstruction and automated exposure control  IMAGE QUALITY:  Diagnostic  FINDINGS: PARENCHYMA:  No intracranial mass, mass effect or midline shift  No CT signs of acute infarction  No acute parenchymal hemorrhage  VENTRICLES AND EXTRA-AXIAL SPACES:  Normal for the patient's age  VISUALIZED ORBITS AND PARANASAL SINUSES:  No acute abnormality involving the orbits  Mild scattered sinus mucosal thickening is noted  No fluid levels are seen  CALVARIUM AND EXTRACRANIAL SOFT TISSUES:  Normal      Impression: No acute intracranial abnormality  Workstation performed: [id number]     Imaging Reports Reviewed Today Include:   Imaging Personally Reviewed by Myself Includes:    Procedure: X-ray Chest 2 Views    Result Date: 9/13/2018  Narrative: CHEST INDICATION:   chest pain  COMPARISON:  None EXAM PERFORMED/VIEWS:  XR CHEST PA & LATERAL FINDINGS: Cardiomediastinal silhouette appears unremarkable  The lungs are clear  No pneumothorax or pleural effusion  Osseous structures appear within normal limits for patient age  Impression: No acute cardiopulmonary disease  Workstation performed: [id number]     Procedure: Ct Head Without Contrast    Result Date: 9/13/2018  Narrative: CT BRAIN - WITHOUT CONTRAST INDICATION:   altered mental status  22 COMPARISON:  None  TECHNIQUE:  CT examination of the brain was performed    In addition to axial images, coronal 2D reformatted images were created and submitted for interpretation  Radiation dose length product (DLP) for this visit:  1031 mGy-cm   This examination, like all CT scans performed in the [facility], was performed utilizing techniques to minimize radiation dose exposure, including the use of iterative reconstruction and automated exposure control  IMAGE QUALITY:  Diagnostic  FINDINGS: PARENCHYMA:  No intracranial mass, mass effect or midline shift  No CT signs of acute infarction  No acute parenchymal hemorrhage  VENTRICLES AND EXTRA-AXIAL SPACES:  Normal for the patient's age  VISUALIZED ORBITS AND PARANASAL SINUSES:  No acute abnormality involving the orbits  Mild scattered sinus mucosal thickening is noted  No fluid levels are seen  CALVARIUM AND EXTRACRANIAL SOFT TISSUES:  Normal      Impression: No acute intracranial abnormality  Workstation performed: [id number]        Recent Cultures (last 7 days):       Results from last 7 days  Lab Units 09/13/18  1600 09/13/18  1552   BLOOD CULTURE  No Growth at 24 hrs  No Growth at 24 hrs         Last 24 Hours Medication List:     Current Facility-Administered Medications:  aspirin 81 mg Oral Daily Mimi Mesa PA-C    heparin (porcine) 5,000 Units Subcutaneous [facility] Mimi Mesa PA-C    hydrALAZINE 10 mg Intravenous Q6H PRN Mimi Mesa PA-C    insulin lispro 1-6 Units Subcutaneous 4x Daily (AC & HS) Mimi Mesa PA-C    LORazepam 1 mg Intravenous Q4H PRN Mimi Mesa PA-C    LORazepam 1 mg Intravenous Q6H PRN Micah Rodriguez MD    nicotine 1 patch Transdermal Daily Mimi Mesa PA-C    nitroglycerin 0 4 mg Sublingual Q5 Min PRN Mimi Mesa PA-C    OLANZapine 5 mg Oral HS Micah Rodriguez MD    ondansetron 4 mg Intravenous Q6H PRN Mimi Mesa PA-C    sodium chloride 125 mL/hr Intravenous Continuous José García PA-C Last Rate: 125 mL/hr (09/15/18 0606)        Today, Patient Was Seen By: Micah Rodriguez MD    ** Please Note: Dragon 360 Dictation voice to text software may have been used in the creation of this document   **

## 2018-09-16 LAB
ATRIAL RATE: 90 BPM
ATRIAL RATE: 93 BPM
BACTERIA UR QL AUTO: ABNORMAL /HPF
BILIRUB UR QL STRIP: NEGATIVE
CLARITY UR: CLEAR
COLOR UR: YELLOW
GLUCOSE SERPL-MCNC: 170 MG/DL (ref 65–140)
GLUCOSE SERPL-MCNC: 172 MG/DL (ref 65–140)
GLUCOSE SERPL-MCNC: 173 MG/DL (ref 65–140)
GLUCOSE SERPL-MCNC: 186 MG/DL (ref 65–140)
GLUCOSE UR STRIP-MCNC: ABNORMAL MG/DL
HGB UR QL STRIP.AUTO: NEGATIVE
KETONES UR STRIP-MCNC: NEGATIVE MG/DL
LEUKOCYTE ESTERASE UR QL STRIP: NEGATIVE
NITRITE UR QL STRIP: NEGATIVE
NON-SQ EPI CELLS URNS QL MICRO: ABNORMAL /HPF
P AXIS: 76 DEGREES
P AXIS: 81 DEGREES
PH UR STRIP.AUTO: 6 [PH] (ref 4.5–8)
PR INTERVAL: 136 MS
PR INTERVAL: 142 MS
PROT UR STRIP-MCNC: ABNORMAL MG/DL
QRS AXIS: 60 DEGREES
QRS AXIS: 64 DEGREES
QRSD INTERVAL: 106 MS
QRSD INTERVAL: 108 MS
QT INTERVAL: 360 MS
QT INTERVAL: 362 MS
QTC INTERVAL: 440 MS
QTC INTERVAL: 450 MS
RBC #/AREA URNS AUTO: ABNORMAL /HPF
SP GR UR STRIP.AUTO: 1.02 (ref 1–1.03)
T WAVE AXIS: 68 DEGREES
T WAVE AXIS: 69 DEGREES
UROBILINOGEN UR QL STRIP.AUTO: 0.2 E.U./DL
VENTRICULAR RATE: 90 BPM
VENTRICULAR RATE: 93 BPM
WBC #/AREA URNS AUTO: ABNORMAL /HPF

## 2018-09-16 PROCEDURE — 87086 URINE CULTURE/COLONY COUNT: CPT | Performed by: INTERNAL MEDICINE

## 2018-09-16 PROCEDURE — 82948 REAGENT STRIP/BLOOD GLUCOSE: CPT

## 2018-09-16 PROCEDURE — G8978 MOBILITY CURRENT STATUS: HCPCS | Performed by: PHYSICAL THERAPIST

## 2018-09-16 PROCEDURE — G8979 MOBILITY GOAL STATUS: HCPCS | Performed by: PHYSICAL THERAPIST

## 2018-09-16 PROCEDURE — G8980 MOBILITY D/C STATUS: HCPCS | Performed by: PHYSICAL THERAPIST

## 2018-09-16 PROCEDURE — 97163 PT EVAL HIGH COMPLEX 45 MIN: CPT | Performed by: PHYSICAL THERAPIST

## 2018-09-16 PROCEDURE — 99232 SBSQ HOSP IP/OBS MODERATE 35: CPT | Performed by: INTERNAL MEDICINE

## 2018-09-16 PROCEDURE — 93010 ELECTROCARDIOGRAM REPORT: CPT | Performed by: INTERNAL MEDICINE

## 2018-09-16 PROCEDURE — 81001 URINALYSIS AUTO W/SCOPE: CPT | Performed by: INTERNAL MEDICINE

## 2018-09-16 RX ORDER — CIPROFLOXACIN 500 MG/1
500 TABLET, FILM COATED ORAL 2 TIMES DAILY
Status: DISCONTINUED | OUTPATIENT
Start: 2018-09-16 | End: 2018-09-17 | Stop reason: HOSPADM

## 2018-09-16 RX ORDER — ALBUTEROL SULFATE 90 UG/1
2 AEROSOL, METERED RESPIRATORY (INHALATION) EVERY 4 HOURS PRN
Status: DISCONTINUED | OUTPATIENT
Start: 2018-09-16 | End: 2018-09-17 | Stop reason: HOSPADM

## 2018-09-16 RX ADMIN — INSULIN LISPRO 1 UNITS: 100 INJECTION, SOLUTION INTRAVENOUS; SUBCUTANEOUS at 17:08

## 2018-09-16 RX ADMIN — INSULIN LISPRO 1 UNITS: 100 INJECTION, SOLUTION INTRAVENOUS; SUBCUTANEOUS at 21:14

## 2018-09-16 RX ADMIN — OLANZAPINE 5 MG: 5 TABLET, FILM COATED ORAL at 21:14

## 2018-09-16 RX ADMIN — CIPROFLOXACIN 500 MG: 500 TABLET, FILM COATED ORAL at 17:08

## 2018-09-16 RX ADMIN — METFORMIN HYDROCHLORIDE 500 MG: 500 TABLET, FILM COATED ORAL at 17:08

## 2018-09-16 RX ADMIN — ASPIRIN 81 MG 81 MG: 81 TABLET ORAL at 08:26

## 2018-09-16 RX ADMIN — SODIUM CHLORIDE 125 ML/HR: 0.9 INJECTION, SOLUTION INTRAVENOUS at 05:44

## 2018-09-16 RX ADMIN — INSULIN LISPRO 1 UNITS: 100 INJECTION, SOLUTION INTRAVENOUS; SUBCUTANEOUS at 12:12

## 2018-09-16 RX ADMIN — NICOTINE 1 PATCH: 14 PATCH, EXTENDED RELEASE TRANSDERMAL at 08:27

## 2018-09-16 RX ADMIN — INSULIN LISPRO 1 UNITS: 100 INJECTION, SOLUTION INTRAVENOUS; SUBCUTANEOUS at 08:26

## 2018-09-16 RX ADMIN — HEPARIN SODIUM 5000 UNITS: 5000 INJECTION INTRAVENOUS; SUBCUTANEOUS at 05:39

## 2018-09-16 RX ADMIN — ALBUTEROL SULFATE 2 PUFF: 90 AEROSOL, METERED RESPIRATORY (INHALATION) at 14:33

## 2018-09-16 RX ADMIN — HEPARIN SODIUM 5000 UNITS: 5000 INJECTION INTRAVENOUS; SUBCUTANEOUS at 13:09

## 2018-09-16 RX ADMIN — HEPARIN SODIUM 5000 UNITS: 5000 INJECTION INTRAVENOUS; SUBCUTANEOUS at 21:14

## 2018-09-16 RX ADMIN — CIPROFLOXACIN 500 MG: 500 TABLET, FILM COATED ORAL at 12:12

## 2018-09-16 NOTE — PHYSICAL THERAPY NOTE
Physical Therapy Evaluation      Patient Active Problem List   Diagnosis    SIRS (systemic inflammatory response syndrome) (HCC)    Elevated d-dimer    Methamphetamine use    Suicidal ideation    Metabolic acidosis    [facility] [address] [facility]    [facility]    Auditory hallucinations    Chest pain       Past Medical History:   Diagnosis Date    Asbestos exposure     [facility]     Drug use     Hypertension     Lung disease        Past Surgical History:   Procedure Laterality Date    NO PAST SURGERIES          09/16/18 1412   Note Type   Note type Eval only   Pain Assessment   Pain Assessment No/denies pain   Home Living   Type of Home Apartment   Home Layout Stairs to enter with rails  (flight of stairs)   Prior Function   Level of [place] Independent with ADLs and functional mobility   Lives With [employer] Help From Family   ADL Assistance Independent   IADLs Independent   Falls in the last 6 months 1 to 4   Vocational Full time employment   Comments pt works as   recent hamstring injury and currently on short term disability, pt wants to discharge home and get back to work   pt has been getting OPPT, indep with HEP   Restrictions/Precautions   Other Precautions 1:1;Seizure   Cognition   Overall Cognitive Status WFL   Orientation Level Oriented X4   RUE Assessment   RUE Assessment WNL   LUE Assessment   LUE Assessment WNL   RLE Assessment   RLE Assessment WNL   LLE Assessment   LLE Assessment X  (hamstring weakness, 4+/5)   Coordination   Movements are Fluid and Coordinated 1   Sensation WFL   Bed Mobility   Rolling R 7  Independent   Rolling L 7  Independent   Supine to Sit 7  Independent   Sit to Supine 7  Independent   Transfers   Sit to Stand 7  Independent   Stand to Sit 7  Independent   Ambulation/Elevation   Gait pattern WNL  (mild widening of walking base)   Gait Assistance 7  Independent   Assistive Device None   Distance 400   Stair Management Assistance 7  Independent   Stair Management Technique One rail R;Alternating pattern; Foreward   Number of Stairs 10   Balance   Static Sitting Normal   Dynamic Sitting Normal   Static Standing Normal   Dynamic Standing Good   Ambulatory Good   Higher level balance Side stepping  (good balance)   Endurance Deficit   Endurance Deficit No  (pt asked for inhaler after amb, no wheezing noted)   Activity Tolerance   Activity Tolerance Patient tolerated treatment well   Nurse Made Aware yes   Assessment   Assessment pt admitted with methaphphetamine use, visual hallucinations, sirs and underwent detox process and refered to PT  pt was indep PTA recently out of work due to hamstring injury, receiving OPPT  pt demonstrated indep amb with only mild widening of walking base but otherwise wnl  pt has mild deficts in balance, gait sequencing, l hamstring strength, and is on 1:1 supervisoin due to suicide ideations  pt was indep amb without assistve device and is steady  pt tolerated well  reviewed pt's HEP and pt able to demonstrate to me  pt does not need skilled PT  recommend OPPT when discharged to finish hamstring rehab  curently no IPPT needs  pt can perform own HEP   Barriers to Discharge None   Goals   Patient Goals "go home now, does not need psyche treatment"   Recommendation   Recommendation Home independently; Outpatient PT   PT - OK to Discharge Yes   Modified Иван Scale   Modified [place] Scale 1   Barthel Index   Feeding 10   Bathing 5   Grooming Score 5   Dressing Score 10   Bladder Score 10   Bowels Score 10   Toilet Use Score 10   Transfers (Bed/Chair) Score 15   Mobility (Level Surface) Score 15   Stairs Score 10   Barthel Index Score 100   History: co - morbidities, fall risk, 1:1, suicide ideation  Exam: impairments in locomotion, musculoskeletal, balance, cognition  Clinical: unstable/unpredictable  Complexity:high        Yessica Brown, PT

## 2018-09-16 NOTE — ED NOTES
Crisis worker started bed search  No beds at [facility], [facility] and [facility]  No beds at [facility] and [facility]      Phone call with Jana Arita, they have a bed and will review  Clinical, face sheet and 201 faxed to Mick

## 2018-09-16 NOTE — ED NOTES
Phone call with Ishan Li, they are unable to verify [facility]  They will keep referral to to the side but need confirmation of insurance before they can accept patient  Per chart, no other insurance is listed

## 2018-09-16 NOTE — ED NOTES
Patient reports being on short term disability due to torn hamstring since mid August 2018  He states [employer] should be active  Patient made aware [facility] is reviewing for dual diagnosis treatment  However, he is unwilling to go there due to distance from his home  Patient reports having no family or supports that could drive him home  Patient made aware there are currently no in network beds  Patient requesting to stay local  Nurse made aware  Tomi made aware  AM bed search needed for an in network bed

## 2018-09-16 NOTE — PROGRESS NOTES
[facility] Progress Note  Patient: Rolan Severance 59 y o  male   MRN: [id number]  PCP: No primary care provider on file  Unit/Bed#: [facility] -01 Encounter: [id number]  Date Of Visit: 09/16/18    Assessment:  Patient presented as a 71-year-old male with history of PTSD BM more  [facility] living with family members for last 7 years with described usage of methamphetamine on weekends but on last week or so became disabled from a hamstring injury was going to physical therapy was having pain issues and decided to binge on methamphetamine also in relation to his ongoing depression over his living environment describes hallucinations as constantly feeling some was going to kill him since there is" a gun in the house someone will blow my had off  Also sees snakes constantly chasing him and convince he is about to die    He has has expressed suicidal ideation to the behavioral health consult also will remain one-to-one until psychiatric admission    Principal Problem:    Methamphetamine use  Active Problems:    SIRS (systemic inflammatory response syndrome) (HCC)    Elevated d-dimer    Suicidal ideation    Metabolic acidosis    GREG (acute kidney injury) ([facility] [address] )    Diabetes (HCC)    Auditory hallucinations    Chest pain      Plan:    · Methamphetamine use on a chronic basis and in active withdrawal with extreme anxiety/depression and hallucinations with paranoid thought does appear to be suicidal  extremely paranoid with psychosis from drug usage will need to sedate with benzodiazepines and antipsychotics  Psychiatry to see evaluate for inpatient psych admission and will sign 201 and treat will continue one-to-one observation  · History of PTSD, had been treated [facility] lost to follow-up needs to be treated and awaiting inpatient psychiatric admission at this point is medically stable for admission to a psych facility once bed available  · Chest pain and myalgias believe in relation to his drug withdrawal CPK elevation in D-dimer I believe are related to dehydration and rhabdomyolysis and not PE  · Metabolic acidosis resolved with IV hydration along with acute kidney injury and rhabdomyolysis has resolved stable for discharge to psych facility  · Rhabdomyolysis will continue to hydrate  · Type 2 diabetes mellitus will continue on sliding scale coverage only for now check hemoglobin A1c noted  level of 7 0 not sure was being treated would probably be okay with metformin once acute kidney injury resolves      VTE Pharmacologic Prophylaxis:   Pharmacologic: Heparin  Mechanical VTE Prophylaxis in Place: No refused by patient    Discussions with Specialists or Other Care Team Provider:   No    Time Spent for Care: 45 minutes  More than 50% of total time spent on counseling and coordination of care as described above  Subjective:   Tearful despondent over his living situation and reason he is here wants to get better continues to deny any thoughts of hurting himself but is convince some months trying to kill him or things such as snakesis constantly chasing him  Extremely anxious and fearful with impending doom and anxiety convince she also has chest pain short of breath but not hourly dyspneic but has twitching of upper and lower extremities/at after accepting and signing 201 patient is having 2nd thoughts but assured him I cannot discharge him he on a safe discharge plan with what he stated to us regarding the gun at home and is fears of dying either hurting himself or others  Complain of leg pains and burning on urination    Objective:     Vitals:   Temp (24hrs), Av 7 °F (37 1 °C), Min:97 8 °F (36 6 °C), Max:100 2 °F (37 9 °C)    HR:  [67-75] 75  Resp:  [18-20] 18  BP: (138-150)/(75-81) 150/75  SpO2:  [96 %-97 %] 96 %  There is no height or weight on file to calculate BMI  Input and Output Summary (last 24 hours):        Intake/Output Summary (Last 24 hours) at 09/16/18 1137  Last data filed at 09/15/18 1700   Gross per 24 hour   Intake              360 ml   Output                0 ml   Net              360 ml       Physical Exam:     Physical Exam:   General appearance: alert, delirious, moderate distress, appears stated age and cooperative  Head: Normocephalic, without obvious abnormality, atraumatic  Lungs: clear to auscultation bilaterally  Heart: regular rate and rhythm  Abdomen: soft, non-tender; bowel sounds normal; no masses,  no organomegaly  Back: negative  Extremities: Tremulous  Neurologic: Mental status: Alert, oriented, thought content appropriate, affect: constricted, increased in intensity and labile, thought content exhibits tangential connections, flight of ideas      Additional Data:     Labs:      Results from last 7 days  Lab Units 09/14/18  0434   WBC Thousand/uL 15 01*   HEMOGLOBIN g/dL 13 6   HEMATOCRIT % 40 8   PLATELETS Thousands/uL 308   NEUTROS PCT % 60   LYMPHS PCT % 28   MONOS PCT % 7   EOS PCT % 3       Results from last 7 days  Lab Units 09/15/18  0453 09/14/18  0434   SODIUM mmol/L 142 142   POTASSIUM mmol/L 3 8 3 6   CHLORIDE mmol/L 109* 107   CO2 mmol/L 24 24   BUN mg/dL 15 28*   CREATININE mg/dL 0 96 1 29   CALCIUM mg/dL 8 1* 8 6   ALK PHOS U/L  --  69   ALT U/L  --  73   AST U/L  --  48*       Results from last 7 days  Lab Units 09/13/18  1552   INR  1 04       * I Have Reviewed All Lab Data Listed Above  * Additional Pertinent Lab Tests Reviewed: All Labs For Current Hospital Admission Reviewed    Imaging:  X-ray Chest 2 Views    Result Date: 9/13/2018  Narrative: CHEST INDICATION:   chest pain  COMPARISON:  None EXAM PERFORMED/VIEWS:  XR CHEST PA & LATERAL FINDINGS: Cardiomediastinal silhouette appears unremarkable  The lungs are clear  No pneumothorax or pleural effusion  Osseous structures appear within normal limits for patient age  Impression: No acute cardiopulmonary disease   Workstation performed: [id number] Ct Head Without Contrast    Result Date: 9/13/2018  Narrative: CT BRAIN - WITHOUT CONTRAST INDICATION:   altered mental status  22 COMPARISON:  None  TECHNIQUE:  CT examination of the brain was performed  In addition to axial images, coronal 2D reformatted images were created and submitted for interpretation  Radiation dose length product (DLP) for this visit:  1031 mGy-cm   This examination, like all CT scans performed in the [facility], was performed utilizing techniques to minimize radiation dose exposure, including the use of iterative reconstruction and automated exposure control  IMAGE QUALITY:  Diagnostic  FINDINGS: PARENCHYMA:  No intracranial mass, mass effect or midline shift  No CT signs of acute infarction  No acute parenchymal hemorrhage  VENTRICLES AND EXTRA-AXIAL SPACES:  Normal for the patient's age  VISUALIZED ORBITS AND PARANASAL SINUSES:  No acute abnormality involving the orbits  Mild scattered sinus mucosal thickening is noted  No fluid levels are seen  CALVARIUM AND EXTRACRANIAL SOFT TISSUES:  Normal      Impression: No acute intracranial abnormality  Workstation performed: [id number]     Imaging Reports Reviewed Today Include:   Imaging Personally Reviewed by Myself Includes:    Procedure: X-ray Chest 2 Views    Result Date: 9/13/2018  Narrative: CHEST INDICATION:   chest pain  COMPARISON:  None EXAM PERFORMED/VIEWS:  XR CHEST PA & LATERAL FINDINGS: Cardiomediastinal silhouette appears unremarkable  The lungs are clear  No pneumothorax or pleural effusion  Osseous structures appear within normal limits for patient age  Impression: No acute cardiopulmonary disease  Workstation performed: [id number]     Procedure: Ct Head Without Contrast    Result Date: 9/13/2018  Narrative: CT BRAIN - WITHOUT CONTRAST INDICATION:   altered mental status  22 COMPARISON:  None  TECHNIQUE:  CT examination of the brain was performed    In addition to axial images, coronal 2D reformatted images were created and submitted for interpretation  Radiation dose length product (DLP) for this visit:  1031 mGy-cm   This examination, like all CT scans performed in the [place], was performed utilizing techniques to minimize radiation dose exposure, including the use of iterative reconstruction and automated exposure control  IMAGE QUALITY:  Diagnostic  FINDINGS: PARENCHYMA:  No intracranial mass, mass effect or midline shift  No CT signs of acute infarction  No acute parenchymal hemorrhage  VENTRICLES AND EXTRA-AXIAL SPACES:  Normal for the patient's age  VISUALIZED ORBITS AND PARANASAL SINUSES:  No acute abnormality involving the orbits  Mild scattered sinus mucosal thickening is noted  No fluid levels are seen  CALVARIUM AND EXTRACRANIAL SOFT TISSUES:  Normal      Impression: No acute intracranial abnormality  Workstation performed: [id number]        Recent Cultures (last 7 days):       Results from last 7 days  Lab Units 09/13/18  1600 09/13/18  1552   BLOOD CULTURE  No Growth at 48 hrs  No Growth at 48 hrs         Last 24 Hours Medication List:     Current Facility-Administered Medications:  aspirin 81 mg Oral Daily Mimi Mesa PA-C   ciprofloxacin 500 mg Oral BID Zoran Choudhury MD   heparin (porcine) 5,000 Units Subcutaneous [facility] Mimi Mesa PA-C   hydrALAZINE 10 mg Intravenous Q6H PRN Mimi Mesa PA-C   insulin lispro 1-6 Units Subcutaneous 4x Daily (AC & HS) Mimi Mesa PA-C   LORazepam 1 mg Intravenous Q4H PRN Mimi Mesa PA-C   LORazepam 1 mg Intravenous Q6H PRN Zoran Choudhury MD   nicotine 1 patch Transdermal Daily Mimi Mesa PA-C   nitroglycerin 0 4 mg Sublingual Q5 Min PRN Mimi Mesa PA-C   OLANZapine 5 mg Oral HS Zoran Choudhury MD   ondansetron 4 mg Intravenous Q6H PRN Carolynn Lorenzo PA-C        Today, Patient Was Seen By: Zoran Choudhury MD    ** Please Note: Dragon 360 Dictation voice to text software may have been used in the creation of this document   **

## 2018-09-17 ENCOUNTER — HOSPITAL ENCOUNTER (INPATIENT)
Facility: HOSPITAL | Age: 63
LOS: 3 days | Discharge: HOME | DRG: 885 | End: 2018-09-20
Attending: PSYCHIATRY & NEUROLOGY | Admitting: PSYCHIATRY & NEUROLOGY
Payer: COMMERCIAL

## 2018-09-17 VITALS
DIASTOLIC BLOOD PRESSURE: 78 MMHG | SYSTOLIC BLOOD PRESSURE: 153 MMHG | WEIGHT: 256.84 LBS | OXYGEN SATURATION: 98 % | HEIGHT: 72 IN | BODY MASS INDEX: 34.79 KG/M2 | TEMPERATURE: 98.9 F | RESPIRATION RATE: 18 BRPM | HEART RATE: 80 BPM

## 2018-09-17 DIAGNOSIS — E11.9 TYPE 2 DIABETES MELLITUS WITHOUT COMPLICATION, WITH LONG-TERM CURRENT USE OF INSULIN (HCC): Chronic | ICD-10-CM

## 2018-09-17 DIAGNOSIS — I10 ACCELERATED HYPERTENSION: ICD-10-CM

## 2018-09-17 DIAGNOSIS — Z79.4 TYPE 2 DIABETES MELLITUS WITHOUT COMPLICATION, WITH LONG-TERM CURRENT USE OF INSULIN (HCC): Chronic | ICD-10-CM

## 2018-09-17 DIAGNOSIS — F33.9 MAJOR DEPRESSION, RECURRENT (HCC): ICD-10-CM

## 2018-09-17 DIAGNOSIS — J45.909 ASTHMA: ICD-10-CM

## 2018-09-17 DIAGNOSIS — R65.10 SIRS (SYSTEMIC INFLAMMATORY RESPONSE SYNDROME) (HCC): Primary | ICD-10-CM

## 2018-09-17 DIAGNOSIS — K59.00 CONSTIPATED: ICD-10-CM

## 2018-09-17 PROBLEM — F17.200 SMOKER: Status: ACTIVE | Noted: 2018-09-17

## 2018-09-17 PROBLEM — Z77.090 ASBESTOS EXPOSURE: Status: ACTIVE | Noted: 2018-09-17

## 2018-09-17 PROBLEM — E66.09 CLASS 1 OBESITY DUE TO EXCESS CALORIES IN ADULT: Chronic | Status: ACTIVE | Noted: 2018-09-17

## 2018-09-17 PROBLEM — E66.09 CLASS 1 OBESITY DUE TO EXCESS CALORIES IN ADULT: Status: ACTIVE | Noted: 2018-09-17

## 2018-09-17 PROBLEM — Z77.090 ASBESTOS EXPOSURE: Chronic | Status: ACTIVE | Noted: 2018-09-17

## 2018-09-17 PROBLEM — F15.10 METHAMPHETAMINE USE (HCC): Chronic | Status: ACTIVE | Noted: 2018-09-13

## 2018-09-17 PROBLEM — F32.3 MAJOR DEPRESSION WITH PSYCHOTIC FEATURES (HCC): Status: ACTIVE | Noted: 2018-09-17

## 2018-09-17 PROBLEM — F17.200 SMOKER: Chronic | Status: ACTIVE | Noted: 2018-09-17

## 2018-09-17 LAB
BACTERIA UR CULT: NORMAL
GLUCOSE SERPL-MCNC: 174 MG/DL (ref 65–140)
GLUCOSE SERPL-MCNC: 188 MG/DL (ref 65–140)
GLUCOSE SERPL-MCNC: 233 MG/DL (ref 65–140)

## 2018-09-17 PROCEDURE — 82948 REAGENT STRIP/BLOOD GLUCOSE: CPT

## 2018-09-17 PROCEDURE — 99253 IP/OBS CNSLTJ NEW/EST LOW 45: CPT | Performed by: HOSPITALIST

## 2018-09-17 PROCEDURE — 99239 HOSP IP/OBS DSCHRG MGMT >30: CPT | Performed by: INTERNAL MEDICINE

## 2018-09-17 RX ORDER — OLANZAPINE 5 MG/1
5 TABLET ORAL
Status: CANCELLED | OUTPATIENT
Start: 2018-09-17

## 2018-09-17 RX ORDER — ASPIRIN 81 MG/1
81 TABLET, CHEWABLE ORAL DAILY
Status: CANCELLED | OUTPATIENT
Start: 2018-09-18

## 2018-09-17 RX ORDER — NICOTINE 21 MG/24HR
1 PATCH, TRANSDERMAL 24 HOURS TRANSDERMAL DAILY
Status: DISCONTINUED | OUTPATIENT
Start: 2018-09-18 | End: 2018-09-20 | Stop reason: HOSPADM

## 2018-09-17 RX ORDER — ALBUTEROL SULFATE 90 UG/1
2 AEROSOL, METERED RESPIRATORY (INHALATION) EVERY 4 HOURS PRN
Status: DISCONTINUED | OUTPATIENT
Start: 2018-09-17 | End: 2018-09-17

## 2018-09-17 RX ORDER — LORAZEPAM 0.5 MG/1
0.5 TABLET ORAL EVERY 4 HOURS PRN
Status: DISCONTINUED | OUTPATIENT
Start: 2018-09-17 | End: 2018-09-20 | Stop reason: HOSPADM

## 2018-09-17 RX ORDER — LISINOPRIL 10 MG/1
10 TABLET ORAL DAILY
Status: DISCONTINUED | OUTPATIENT
Start: 2018-09-18 | End: 2018-09-20 | Stop reason: HOSPADM

## 2018-09-17 RX ORDER — ACETAMINOPHEN 325 MG/1
650 TABLET ORAL EVERY 4 HOURS PRN
Status: DISCONTINUED | OUTPATIENT
Start: 2018-09-17 | End: 2018-09-20 | Stop reason: HOSPADM

## 2018-09-17 RX ORDER — LANOLIN ALCOHOL/MO/W.PET/CERES
3 CREAM (GRAM) TOPICAL
Status: DISCONTINUED | OUTPATIENT
Start: 2018-09-17 | End: 2018-09-20 | Stop reason: HOSPADM

## 2018-09-17 RX ORDER — OLANZAPINE 5 MG/1
5 TABLET ORAL
Status: DISCONTINUED | OUTPATIENT
Start: 2018-09-17 | End: 2018-09-20 | Stop reason: HOSPADM

## 2018-09-17 RX ORDER — ASPIRIN 81 MG/1
81 TABLET, CHEWABLE ORAL DAILY
Status: DISCONTINUED | OUTPATIENT
Start: 2018-09-18 | End: 2018-09-20 | Stop reason: HOSPADM

## 2018-09-17 RX ORDER — NICOTINE 21 MG/24HR
1 PATCH, TRANSDERMAL 24 HOURS TRANSDERMAL DAILY
Status: DISCONTINUED | OUTPATIENT
Start: 2018-09-18 | End: 2018-09-17

## 2018-09-17 RX ORDER — OLANZAPINE 10 MG/1
5 INJECTION, POWDER, LYOPHILIZED, FOR SOLUTION INTRAMUSCULAR EVERY 8 HOURS PRN
Status: DISCONTINUED | OUTPATIENT
Start: 2018-09-17 | End: 2018-09-20 | Stop reason: HOSPADM

## 2018-09-17 RX ORDER — ALBUTEROL SULFATE 90 UG/1
2 AEROSOL, METERED RESPIRATORY (INHALATION) EVERY 6 HOURS PRN
Status: DISCONTINUED | OUTPATIENT
Start: 2018-09-17 | End: 2018-09-20 | Stop reason: HOSPADM

## 2018-09-17 RX ORDER — OLANZAPINE 5 MG/1
5 TABLET ORAL EVERY 8 HOURS PRN
Status: DISCONTINUED | OUTPATIENT
Start: 2018-09-17 | End: 2018-09-20 | Stop reason: HOSPADM

## 2018-09-17 RX ORDER — NITROGLYCERIN 0.4 MG/1
0.4 TABLET SUBLINGUAL
Status: DISCONTINUED | OUTPATIENT
Start: 2018-09-17 | End: 2018-09-20 | Stop reason: HOSPADM

## 2018-09-17 RX ORDER — LORAZEPAM 2 MG/ML
0.5 INJECTION INTRAMUSCULAR EVERY 4 HOURS PRN
Status: DISCONTINUED | OUTPATIENT
Start: 2018-09-17 | End: 2018-09-20 | Stop reason: HOSPADM

## 2018-09-17 RX ORDER — NICOTINE 21 MG/24HR
1 PATCH, TRANSDERMAL 24 HOURS TRANSDERMAL DAILY
Status: CANCELLED | OUTPATIENT
Start: 2018-09-18

## 2018-09-17 RX ORDER — DOCUSATE SODIUM 100 MG/1
100 CAPSULE, LIQUID FILLED ORAL 2 TIMES DAILY
Status: DISCONTINUED | OUTPATIENT
Start: 2018-09-17 | End: 2018-09-20 | Stop reason: HOSPADM

## 2018-09-17 RX ORDER — NITROGLYCERIN 0.4 MG/1
0.4 TABLET SUBLINGUAL
Status: CANCELLED | OUTPATIENT
Start: 2018-09-17

## 2018-09-17 RX ORDER — POLYETHYLENE GLYCOL 3350 17 G/17G
17 POWDER, FOR SOLUTION ORAL DAILY PRN
Status: DISCONTINUED | OUTPATIENT
Start: 2018-09-17 | End: 2018-09-20 | Stop reason: HOSPADM

## 2018-09-17 RX ORDER — ALBUTEROL SULFATE 90 UG/1
2 AEROSOL, METERED RESPIRATORY (INHALATION) EVERY 4 HOURS PRN
Status: CANCELLED | OUTPATIENT
Start: 2018-09-17

## 2018-09-17 RX ORDER — TRAZODONE HYDROCHLORIDE 50 MG/1
50 TABLET ORAL
Status: DISCONTINUED | OUTPATIENT
Start: 2018-09-17 | End: 2018-09-20 | Stop reason: HOSPADM

## 2018-09-17 RX ADMIN — INSULIN LISPRO 1 UNITS: 100 INJECTION, SOLUTION INTRAVENOUS; SUBCUTANEOUS at 08:45

## 2018-09-17 RX ADMIN — HEPARIN SODIUM 5000 UNITS: 5000 INJECTION INTRAVENOUS; SUBCUTANEOUS at 13:44

## 2018-09-17 RX ADMIN — NICOTINE 1 PATCH: 14 PATCH, EXTENDED RELEASE TRANSDERMAL at 08:46

## 2018-09-17 RX ADMIN — INSULIN LISPRO 1 UNITS: 100 INJECTION, SOLUTION INTRAVENOUS; SUBCUTANEOUS at 16:48

## 2018-09-17 RX ADMIN — INSULIN LISPRO 4 UNITS: 100 INJECTION, SOLUTION INTRAVENOUS; SUBCUTANEOUS at 21:12

## 2018-09-17 RX ADMIN — CIPROFLOXACIN 500 MG: 500 TABLET, FILM COATED ORAL at 17:15

## 2018-09-17 RX ADMIN — ASPIRIN 81 MG 81 MG: 81 TABLET ORAL at 08:45

## 2018-09-17 RX ADMIN — METFORMIN HYDROCHLORIDE 500 MG: 500 TABLET, FILM COATED ORAL at 08:45

## 2018-09-17 RX ADMIN — CIPROFLOXACIN 500 MG: 500 TABLET, FILM COATED ORAL at 08:45

## 2018-09-17 RX ADMIN — INSULIN LISPRO 3 UNITS: 100 INJECTION, SOLUTION INTRAVENOUS; SUBCUTANEOUS at 12:38

## 2018-09-17 RX ADMIN — OLANZAPINE 5 MG: 5 TABLET, FILM COATED ORAL at 20:40

## 2018-09-17 RX ADMIN — HEPARIN SODIUM 5000 UNITS: 5000 INJECTION INTRAVENOUS; SUBCUTANEOUS at 05:19

## 2018-09-17 RX ADMIN — ALBUTEROL SULFATE 2 PUFF: 90 AEROSOL, METERED RESPIRATORY (INHALATION) at 05:23

## 2018-09-17 RX ADMIN — METFORMIN HYDROCHLORIDE 500 MG: 500 TABLET, FILM COATED ORAL at 16:48

## 2018-09-17 NOTE — PLAN OF CARE

## 2018-09-17 NOTE — SOCIAL WORK
Pt was accepted at [facility]  CM arranged Magdaleno MULLEN today at 6:00 PM       [facility] [phone]

## 2018-09-17 NOTE — DISCHARGE SUMMARY
Discharge Summary - Fabiola Sanford, 1955, [id number]        Admission Date: 9/13/2018  Discharge Date: 9/17/2018    Admitting Diagnosis: Leukocytosis [D72 829]  Recreational drug use [F19 90]  GREG (acute kidney injury) ([facility] [address] ) [N17 9]    Discharge Diagnosis:   1  Methamphetamine use  2  Depression with suicidal ideation  3  Posttraumatic stress disorder  4  Systemic inflammatory response syndrome secondary to 1   5   Acute kidney injury, resolved  6  Type 2 diabetes    Consulting Physicians:  1  Dr Olamide Hicks, psychiatry    Procedures Performed:   None    HPI:  The patient is a 81-year-old man who has a history of posttraumatic stress disorder and depression  He has used methamphetamine for some time but his use has intensified lately  He came to the emergency room with auditory and visual hallucinations  He was paranoid  He was suicidal   He was admitted for further care  Hospital Course: The patient was admitted to the hospital and monitored carefully on telemetry  He was hydrated with intravenous fluid  He was provided with appropriate sedation  He was seen in consultation by Psychiatry who guided further pharmacologic therapy  He was felt to be significantly depressed inpatient psychiatric care was recommended  The patient gradually improved during his stay  He became less agitated  His hallucinations resolved  The patient was noted to have acute kidney injury at the time of admission  This resolved with IV fluid  He had systemic inflammatory response syndrome criteria at the time of admission  This was related to methamphetamine  There was no evidence of infection  The patient has type 2 diabetes which was stable during his stay  Disposition:  The patient was transferred to the behavioral health unit at [facility] on September 17th  He will continue with diabetic diet  His activity will be as tolerated    He will be under the care of the physicians at [facility] during his stay there  Discharge instructions/Information to patient and family:   See after visit summary for information provided to patient and family  Provisions for Follow-Up Care:  See after visit summary for information related to follow-up care and any pertinent home health orders  Planned Readmission: No    Discharge Statement   I spent 40 minutes discharging the patient  This time was spent on the day of discharge  I had direct contact with the patient on the day of discharge  Discharge Medications:  See after visit summary for reconciled discharge medications provided to patient and family

## 2018-09-17 NOTE — PLAN OF CARE
Anxiety     Anxiety is at manageable level Progressing        Depression     Treatment Goal: Demonstrate behavioral control of depressive symptoms, verbalize feelings of improved mood/affect, and adopt new coping skills prior to discharge Progressing     Verbalize thoughts and feelings Progressing     Refrain from harming self Progressing     Refrain from [address] from self-neglect Progressing     Attend and participate in unit activities, including therapeutic, recreational, and educational groups Progressing     Complete daily ADLs, including personal hygiene independently, as able Progressing        Ineffective Coping     Cooperates with admission process Progressing     Identifies ineffective coping skills Progressing     Identifies healthy coping skills Progressing     Demonstrates healthy coping skills Progressing     Participates in unit activities Progressing     Patient/Family participate in treatment and DC plans Progressing     Patient/Family verbalizes awareness of resources Progressing     Understands least restrictive measures Progressing     Free from restraint events Progressing        Prexisting or High Potential for Compromised Skin Integrity     Skin integrity is maintained or improved Progressing        Risk for Self Injury/Neglect     Treatment Goal: Remain safe during length of stay, learn and adopt new coping skills, and be free of self-injurious ideation, impulses and acts at the time of discharge Progressing     Verbalize thoughts and feelings Progressing     Refrain from harming self Progressing     Attend and participate in unit activities, including therapeutic, recreational, and educational groups Progressing     Recognize maladaptive responses and adopt new coping mechanisms Progressing     Complete daily ADLs, including personal hygiene independently, as able Progressing

## 2018-09-17 NOTE — PROGRESS NOTES
Progress Note - Remedios Bustamante 59 y o  male MRN: [id number]    Unit/Bed#: [place] [address] -01 Encounter: [id number]      Subjective: The patient feels reasonably well at the moment  He says that he slept well  He is eating well  He has had no chest pain, shortness of breath, palpitations, or dizziness  He denies abdominal pain, nausea, or vomiting  Physical Exam:   Temp:  [97 3 °F (36 3 °C)-99 5 °F (37 5 °C)] 99 5 °F (37 5 °C)  HR:  [65-69] 69  Resp:  [18] 18  BP: (118-157)/(62-83) 157/81    Gen:  Well-developed, in no distress  Neck:  Supple  No lymphadenopathy, goiter, or bruit  Heart:  Regular rhythm  No murmur, gallop, or rub  Lungs:  Clear to auscultation and percussion  No wheezing, rales, or rhonchi    Abd:  Soft with active bowel sounds  No mass, tenderness, or organomegaly  Extremities:  No clubbing, cyanosis, or edema  No calf tenderness  Neuro:  Alert and oriented  Calm  No focal sign  Skin:  Warm and dry      LABS:  No new labs        Patient Active Problem List   Diagnosis    SIRS (systemic inflammatory response syndrome) (HCC)    Elevated d-dimer    Methamphetamine use    Suicidal ideation    Metabolic acidosis    [facility]) ([facility] [address] )    Diabetes ([facility] [address] )    Auditory hallucinations    Chest pain       Assessment/Plan:  1  Methamphetamine use  2  The depression with suicidal ideation  3  Posttraumatic stress disorder  4  Systemic inflammatory response syndrome on admission, secondary to 1   5   Acute kidney injury on admission, resolved  6  Type 2 diabetes    The patient has improved significantly  He is hemodynamically stable  His mood has improved somewhat  His diabetes is stable  We are awaiting placement in a psychiatric facility      VTE Pharmacologic Prophylaxis: Heparin  VTE Mechanical Prophylaxis:  None

## 2018-09-17 NOTE — NURSING NOTE
Pt  Transferred to [facility] with 201 paper work  Report called in to Chaya Cummnigs  Pt  Sent with all personal items including things locked up with security  Pt  Transported by [facility] transport via stretcher

## 2018-09-17 NOTE — PLAN OF CARE
CARDIOVASCULAR - ADULT     Maintains optimal cardiac output and hemodynamic stability Adequate for Discharge     Absence of cardiac dysrhythmias or at baseline rhythm Adequate for Discharge        DISCHARGE PLANNING     Discharge to home or other facility with appropriate resources Adequate for Discharge        DISCHARGE PLANNING - CARE MANAGEMENT     Discharge to post-acute care or home with appropriate resources Adequate for Discharge        Knowledge Deficit     Patient/family/caregiver demonstrates understanding of disease process, treatment plan, medications, and discharge instructions Adequate for Discharge        METABOLIC, FLUID AND ELECTROLYTES - ADULT     Electrolytes maintained within normal limits Adequate for Discharge     Fluid balance maintained Adequate for Discharge     Glucose maintained within target range Adequate for Discharge        NEUROSENSORY - ADULT     Achieves stable or improved neurological status Adequate for Discharge     Absence of seizures Adequate for Discharge     Remains free of injury related to seizures activity Adequate for Discharge     Achieves maximal functionality and self care Adequate for Discharge        Potential for Falls     Patient will remain free of falls Adequate for Discharge        RESPIRATORY - ADULT     Achieves optimal ventilation and oxygenation Adequate for Discharge        SAFETY ADULT     Maintain or return to baseline ADL function Adequate for Discharge     Maintain or return mobility status to optimal level Adequate for Discharge

## 2018-09-17 NOTE — SOCIAL WORK
CM met with pt at bedside to plan for d/c  Pt lives with his sister and his nephews  He was independent PTA  He has no OP psych care  He reports his PCP is Dr Leslie Duval [facility])  Pt does not think that his primary issue is drug use  He recently has been out of work due to a ham string injury and on temporary disability  He is a   He states that he had no purpose and became board and depressed and therefor increased his meth use  He previously used cocaine but reports it has been over a year since he used that  Pt had recent psych stay at [facility]  He would like to go to return there if possible  He does not want to go out of the area and aware there are no local dual units  [facility] looking for a bed

## 2018-09-18 LAB
ANION GAP SERPL CALCULATED.3IONS-SCNC: 6 MMOL/L (ref 5–14)
BACTERIA BLD CULT: NORMAL
BACTERIA BLD CULT: NORMAL
BASOPHILS # BLD AUTO: 0.1 THOUSANDS/ΜL (ref 0–0.1)
BASOPHILS NFR BLD AUTO: 1 % (ref 0–1)
BUN SERPL-MCNC: 12 MG/DL (ref 5–25)
CALCIUM SERPL-MCNC: 9.2 MG/DL (ref 8.4–10.2)
CHLORIDE SERPL-SCNC: 105 MMOL/L (ref 97–108)
CO2 SERPL-SCNC: 27 MMOL/L (ref 22–30)
CREAT SERPL-MCNC: 0.75 MG/DL (ref 0.7–1.5)
EOSINOPHIL # BLD AUTO: 0.6 THOUSAND/ΜL (ref 0–0.4)
EOSINOPHIL NFR BLD AUTO: 5 % (ref 0–6)
ERYTHROCYTE [DISTWIDTH] IN BLOOD BY AUTOMATED COUNT: 13.9 %
GFR SERPL CREATININE-BSD FRML MDRD: 98 ML/MIN/1.73SQ M
GLUCOSE P FAST SERPL-MCNC: 172 MG/DL (ref 70–99)
GLUCOSE SERPL-MCNC: 152 MG/DL (ref 70–99)
GLUCOSE SERPL-MCNC: 157 MG/DL (ref 70–99)
GLUCOSE SERPL-MCNC: 172 MG/DL (ref 70–99)
GLUCOSE SERPL-MCNC: 183 MG/DL (ref 70–99)
GLUCOSE SERPL-MCNC: 190 MG/DL (ref 70–99)
GLUCOSE SERPL-MCNC: 249 MG/DL (ref 70–99)
HCT VFR BLD AUTO: 43.7 % (ref 41–53)
HGB BLD-MCNC: 14.3 G/DL (ref 13.5–17.5)
LYMPHOCYTES # BLD AUTO: 3.6 THOUSANDS/ΜL (ref 0.5–4)
LYMPHOCYTES NFR BLD AUTO: 29 % (ref 20–50)
MCH RBC QN AUTO: 28.9 PG (ref 26–34)
MCHC RBC AUTO-ENTMCNC: 32.8 G/DL (ref 31–36)
MCV RBC AUTO: 88 FL (ref 80–100)
MONOCYTES # BLD AUTO: 0.9 THOUSAND/ΜL (ref 0.2–0.9)
MONOCYTES NFR BLD AUTO: 8 % (ref 1–10)
NEUTROPHILS # BLD AUTO: 7.2 THOUSANDS/ΜL (ref 1.8–7.8)
NEUTS SEG NFR BLD AUTO: 58 % (ref 45–65)
PLATELET # BLD AUTO: 263 THOUSANDS/UL (ref 150–450)
PMV BLD AUTO: 8.2 FL (ref 8.9–12.7)
POTASSIUM SERPL-SCNC: 4.1 MMOL/L (ref 3.6–5)
RBC # BLD AUTO: 4.95 MILLION/UL (ref 4.5–5.9)
SODIUM SERPL-SCNC: 138 MMOL/L (ref 137–147)
WBC # BLD AUTO: 12.4 THOUSAND/UL (ref 4.5–11)

## 2018-09-18 PROCEDURE — 85025 COMPLETE CBC W/AUTO DIFF WBC: CPT | Performed by: HOSPITALIST

## 2018-09-18 PROCEDURE — 80048 BASIC METABOLIC PNL TOTAL CA: CPT | Performed by: HOSPITALIST

## 2018-09-18 PROCEDURE — 82948 REAGENT STRIP/BLOOD GLUCOSE: CPT

## 2018-09-18 RX ORDER — ESCITALOPRAM OXALATE 10 MG/1
10 TABLET ORAL DAILY
Status: DISCONTINUED | OUTPATIENT
Start: 2018-09-18 | End: 2018-09-20 | Stop reason: HOSPADM

## 2018-09-18 RX ADMIN — DOCUSATE SODIUM 100 MG: 100 CAPSULE, LIQUID FILLED ORAL at 17:06

## 2018-09-18 RX ADMIN — INSULIN LISPRO 2 UNITS: 100 INJECTION, SOLUTION INTRAVENOUS; SUBCUTANEOUS at 21:13

## 2018-09-18 RX ADMIN — DOCUSATE SODIUM 100 MG: 100 CAPSULE, LIQUID FILLED ORAL at 08:20

## 2018-09-18 RX ADMIN — ESCITALOPRAM OXALATE 10 MG: 10 TABLET ORAL at 10:48

## 2018-09-18 RX ADMIN — POLYETHYLENE GLYCOL 3350 17 G: 17 POWDER, FOR SOLUTION ORAL at 08:19

## 2018-09-18 RX ADMIN — NICOTINE 1 PATCH: 14 PATCH, EXTENDED RELEASE TRANSDERMAL at 08:20

## 2018-09-18 RX ADMIN — ASPIRIN 81 MG 81 MG: 81 TABLET ORAL at 08:20

## 2018-09-18 RX ADMIN — INSULIN LISPRO 2 UNITS: 100 INJECTION, SOLUTION INTRAVENOUS; SUBCUTANEOUS at 12:04

## 2018-09-18 RX ADMIN — INSULIN LISPRO 2 UNITS: 100 INJECTION, SOLUTION INTRAVENOUS; SUBCUTANEOUS at 08:19

## 2018-09-18 RX ADMIN — METFORMIN HYDROCHLORIDE 500 MG: 500 TABLET, FILM COATED ORAL at 08:20

## 2018-09-18 RX ADMIN — LISINOPRIL 10 MG: 10 TABLET ORAL at 08:20

## 2018-09-18 RX ADMIN — OLANZAPINE 5 MG: 5 TABLET, FILM COATED ORAL at 21:13

## 2018-09-18 RX ADMIN — METFORMIN HYDROCHLORIDE 500 MG: 500 TABLET, FILM COATED ORAL at 17:06

## 2018-09-18 RX ADMIN — INSULIN LISPRO 2 UNITS: 100 INJECTION, SOLUTION INTRAVENOUS; SUBCUTANEOUS at 17:25

## 2018-09-18 NOTE — PROGRESS NOTES
Pt attended reminiscence group  Pt was able to participate and engage in conversation with peers once prompted  Quiet and guarded at first but then able to speak about his likes of music  Pt identified that he like classic rock music  Pt mentioned his favorite guitarists and had a conversation with another peer and smiled  Pt disheveled in appearance and initially blunt affect which improved with conversation  Continue to provide therapeutic group support

## 2018-09-18 NOTE — H&P
Initial Psychiatric Evaluation    Medical Record Number: [id number]  Encounter: [id number]      History:     Susie Kaye is an 59 y o , male, admitted to the psychiatric unit under a 201 voluntary status to Dr Bharath Velásquez' service with the chief complaint of depression, suicidal ideations, hallucinations due to methamphetamine use  The patient originally was at [facility] on September 14 due to snorting methamphetamine and becoming paranoid and having visual hallucinations  Patient was diagnosed with sirs possible from acute methamphetamine use  There is no signs or symptoms of acute infection  Once the patient was medically stable he was transferred to the older adult psychiatric unit at [facility] for further evaluation and treatment  The patient is alert and oriented x4  He states he has been more depressed recently since he ran out of his Lexapro  He states he has been off of this for about 2 months  He states when he was off of his medication he started feeling more irritable and depressed and then started using methamphetamine again  He states when he used methamphetamine he started hallucinating and seeing snakes chasing him  He felt very paranoid  He states he does not feel this way when he is not using methamphetamine  He states he has had about 5 rehab stays due to methamphetamine  He continues to work as a   He states he never followed up at mental health in [facility] for more of his medication  Patient is living with his sister and nephews  He states his sister is very supportive  He denies other drug use or alcohol use  He states he has PTSD due to being in the [facility] and from his childhood  The patient states he has had an issue on and off for years with methamphetamine  He does not wish to do inpatient rehab at this time  He states he wants to restart lexapro because when he was on it he felt well and did not have the urge to use   Patient states he did sleep last night  He denies suicidal or homicidal ideations currently  He denies hallucinations  Past Medical History:   Diagnosis Date    Asbestos exposure     Diabetes mellitus ([address] )     Drug use     Hypertension     Lung disease        Past surgical history:  Past Surgical History:   Procedure Laterality Date    NO PAST SURGERIES         Family history:  History reviewed  No pertinent family history      Current medications:    Current Facility-Administered Medications:     acetaminophen (TYLENOL) tablet 650 mg, 650 mg, Oral, Q4H PRN, Jose Stovall PA-C    albuterol (PROVENTIL HFA,VENTOLIN HFA) inhaler 2 puff, 2 puff, Inhalation, Q6H PRN, Marta Miranda MD    aspirin chewable tablet 81 mg, 81 mg, Oral, Daily, Jennifer Bourgeois MD, 81 mg at 09/18/18 0820    docusate sodium (COLACE) capsule 100 mg, 100 mg, Oral, BID, Marta Miranda MD, 100 mg at 09/18/18 0820    escitalopram (LEXAPRO) tablet 10 mg, 10 mg, Oral, Daily, Jose Stovall PA-C    insulin lispro (HumaLOG) 100 units/mL subcutaneous injection 2-12 Units, 2-12 Units, Subcutaneous, TID AC, 2 Units at 09/18/18 0819 **AND** Fingerstick Glucose (POCT), , , TID AC, Marta Miranda MD    insulin lispro (HumaLOG) 100 units/mL subcutaneous injection 2-12 Units, 2-12 Units, Subcutaneous, HS, Marta Miranda MD, 4 Units at 09/17/18 2112    lisinopril (ZESTRIL) tablet 10 mg, 10 mg, Oral, Daily, Marta Miranda MD, 10 mg at 09/18/18 0820    LORazepam (ATIVAN) 2 mg/mL injection 0 5 mg, 0 5 mg, Intramuscular, Q4H PRN, Jose Stovall PA-C    LORazepam (ATIVAN) tablet 0 5 mg, 0 5 mg, Oral, Q4H PRN, Jose Stovall PA-C    melatonin tablet 3 mg, 3 mg, Oral, HS PRN, Marta Miranda MD    metFORMIN (GLUCOPHAGE) tablet 500 mg, 500 mg, Oral, BID With Meals, Jennifer Bourgeois MD, 500 mg at 09/18/18 0820    nicotine (NICODERM CQ) 14 mg/24hr TD 24 hr patch 1 patch, 1 patch, Transdermal, Daily, Jose Stovall PA-C, 1 patch at 09/18/18 0820    nitroglycerin (NITROSTAT) SL tablet 0 4 mg, 0 4 mg, Sublingual, Q5 Min PRN, Stephen Barr MD    OLANZapine (ZyPREXA) IM injection 5 mg, 5 mg, Intramuscular, Q8H PRN, ALIREZA Peñaloza-C    OLANZapine (ZyPREXA) tablet 5 mg, 5 mg, Oral, HS, Stephen Barr MD, 5 mg at 09/17/18 2040    OLANZapine (ZyPREXA) tablet 5 mg, 5 mg, Oral, Q8H PRN, Carl Lopez PA-C    polyethylene glycol (MIRALAX) packet 17 g, 17 g, Oral, Daily PRN, Alycia Ganser, MD, 17 g at 09/18/18 0819    traZODone (DESYREL) tablet 50 mg, 50 mg, Oral, HS PRN, ALIREZA Peñaloza-C      Allergies: Allergies   Allergen Reactions    Penicillin V Hives    Breo Ellipta [Fluticasone Furoate-Vilanterol]        Social History:  Social History     Social History    Marital status: Single     Spouse name: N/A    Number of children: N/A    Years of education: N/A     Occupational History    Not on file       Social History Main Topics    Smoking status: Current Every Day Smoker     Packs/day: 2 00    Smokeless tobacco: Never Used    Alcohol use No    Drug use: Yes     Types: Methamphetamines    Sexual activity: Not on file     Other Topics Concern    Not on file     Social History Narrative    No narrative on file         Physical Examination:     Vital Signs:  Vitals:    09/17/18 1900 09/18/18 0705   BP: (!) 174/84 122/57   BP Location: Right arm Left arm   Pulse: 73 64   Resp: 19 18   Temp: 97 6 °F (36 4 °C) (!) 96 8 °F (36 °C)   TempSrc: Temporal Temporal   SpO2: 96% 94%   Weight: 115 kg (253 lb 14 4 oz)    Height: 6' (1 829 m)          Appearance:  age appropriate and casually dressed   Behavior:  normal   Speech:  normal volume   Mood:  anxious and depressed   Affect:  constricted   Thought Process:  normal   Thought Content:  normal   Perceptual Disturbances: None   Risk Potential: none   Sensorium:  person, place, situation and time   Cognition:  intact   Consciousness:  alert and awake    Attention: attention span and concentration were age appropriate   Intellect: average   Insight:  fair   Judgment: fair      Motor Activity: no abnormal movements           Diagnostic Studies:     Recent Labs:  Results Reviewed     None          I/O Past 24 hours:  No intake/output data recorded  I/O this shift:  In: 240 [P O :240]  Out: -         Impression / Plan:     Major depression, recurrent ([facility] )    Recommended Treatment:      Medications  1) Start lexapro 10mg daily  Non-pharmacological treatments  1) Continue with group therapy, milieu therapy and occupational therapy  2) Medical will be consulted to help manage comorbid conditions    Safety  1) Safety/communication plan established targeting dynamic risk factors above  Counseling / Coordination of Care    Total floor / unit time spent today 50 minutes  Greater than 50% of total time was spent with the patient and / or family counseling and / or coordination of care  A description of the counseling / coordination of care  Patient's Rights, confidentiality and exceptions to confidentiality, use of automated medical record, [facility] staff access to medical record, and consent to treatment reviewed        Yohan Marte PA-C

## 2018-09-18 NOTE — SOCIAL WORK
Spoke with Janine Mata to request a drug and alcohol assessment on the patient  She stated that she would assess patient tomorrow  Will follow-up tomorrow

## 2018-09-18 NOTE — PLAN OF CARE
Ineffective Coping     Cooperates with admission process Completed          Depression     Refrain from isolation Not Progressing        Ineffective Coping     Participates in unit activities Not Progressing          Anxiety     Anxiety is at manageable level Progressing        Depression     Treatment Goal: Demonstrate behavioral control of depressive symptoms, verbalize feelings of improved mood/affect, and adopt new coping skills prior to discharge Progressing     Verbalize thoughts and feelings Progressing     Refrain from harming self Progressing     Refrain from self-neglect Progressing     Attend and participate in unit activities, including therapeutic, recreational, and educational groups Progressing     Complete daily ADLs, including personal hygiene independently, as able Progressing        Ineffective Coping     Identifies ineffective coping skills Progressing     Identifies healthy coping skills Progressing     Demonstrates healthy coping skills Progressing     Patient/Family participate in treatment and DC plans Progressing     Patient/Family verbalizes awareness of resources Progressing     Understands least restrictive measures Progressing     Free from restraint events Progressing        Prexisting or High Potential for Compromised Skin Integrity     Skin integrity is maintained or improved Progressing        Risk for Self Injury/Neglect     Treatment Goal: Remain safe during length of stay, learn and adopt new coping skills, and be free of self-injurious ideation, impulses and acts at the time of discharge Progressing     Verbalize thoughts and feelings Progressing     Refrain from harming self Progressing     Attend and participate in unit activities, including therapeutic, recreational, and educational groups Progressing     Recognize maladaptive responses and adopt new coping mechanisms Progressing     Complete daily ADLs, including personal hygiene independently, as able Progressing

## 2018-09-18 NOTE — PROGRESS NOTES
Pt with no attempts at self-harm noted  Good appetite at lunch   at 1100  Pleasant and cooperative  Affect constricted  Monitored on SP1 for safety and support

## 2018-09-18 NOTE — NURSING NOTE
Patient arrived to the unit at 1900 via wheelchair from [address]  201 paperwork signed and filed in chart  Patient is alert and oriented X4  Patient stated, "I was using Meth and I started Hallucinating  I became paranoid and suicidal " Upon assessment, Patient denies SI/VH/AHs at this time  Patient is pleasant and cooperative upon approach  Patient c/o of 10/10 depression  Heart rate regular, positive S1/S2, positive pulses X4, Denies Chest pain  Lungs diminished, No Sob or Resp distress noted  Abd-distended, non-tender, patient unable to state last BM, positive bowel sounds X4  Skin warm, dry, and intact  Patient ambulates independently, gait steady  ID band, fall, and allergy band applied per protocol  Patient oriented to his room and introduced to staff members  Patient denies any pain or discomfort at this time  SP1 precautions maintained

## 2018-09-18 NOTE — ASSESSMENT & PLAN NOTE
With COPD, patient allergic to inhaled steroids and LABA  Albuterol inhaler p r n    Non oxygen dependent

## 2018-09-18 NOTE — PROGRESS NOTES
Pt denied SI  Good appetite and steady gait  Med-compliant  Pleasant with constricted affect  VSS   at 0800  WBC 12 4, D  [address] notified  Pt given miralax po prn, will monitor for effect  Monitored on SP1 for safety and support

## 2018-09-18 NOTE — CONSULTS
Consult- Iris Wu 1955, 59 y o  male MRN: [id number]    Unit/Bed#: [facility] 287-68 Encounter: [id number]    Primary Care Provider: No primary care provider on file  Date and time admitted to hospital: 9/17/2018  6:59 PM      Consults    * Major depression, recurrent ([facility] )   Assessment & Plan    Management as per Psychiatry  Patient considered to be an acceptable risk for ECT        GREG (acute kidney injury) ([facility] )   Assessment & Plan    Resolved with IV fluids at SLA  Creatinine 0 96  Will restart lisinopril for hypertension          Accelerated hypertension   Assessment & Plan    Did not take his blood pressure medication  Restart lisinopril given normal renal function and DM        Methamphetamine use   Assessment & Plan    In the setting of depression  Management as per Psychiatry          SIRS (systemic inflammatory response syndrome) ([place] [address] )   Assessment & Plan    Resolved, no obvious underlying infectious process and most likely secondary to methamphetamine use  Will continue to trend vital signs WBC        Type 2 diabetes mellitus without complication, with long-term current use of insulin [facility])   Assessment & Plan    Lab Results   Component Value Date    HGBA1C 7 0 (H) 09/13/2018     Continue metformin Accu-Cheks insulin sliding scale  ADA diet    Recent Labs      09/16/18   2113  09/17/18   0748  09/17/18   1120  09/17/18   1624   POCGLU  173*  174*  233*  188*       Blood Sugar Average: Last 72 hrs:          Asbestos exposure   Assessment & Plan    With COPD, patient allergic to inhaled steroids and LABA  Albuterol inhaler p r n    Non oxygen dependent        Smoker   Assessment & Plan    2 packs per day  Patient was advised to stop smoking  Nicotine patch ordered        Class 1 obesity due to excess calories in adult   Assessment & Plan    Low-calorie diet            VTE Prophylaxis: Reason for no pharmacologic prophylaxis Moderate risk patient is ambulating  / reason for no mechanical VTE prophylaxis Psych floor     Recommendations for Discharge:  · Follow up with PCP and Psychiatry    Counseling / Coordination of Care Time: 45 minutes  Greater than 50% of total time spent on patient counseling and coordination of care  Collaboration of Care: Were Recommendations Directly Discussed with Primary Treatment Team? - Yes     History of Present Illness:    Matilde Quijano is a 59 y o  male who is originally admitted to the psychiatry service due to depression, methamphetamine use  We are consulted for medical management of his other comorbidities  Upon my assessment patient was cooperative, maintained good eye contact  He denies any medical related complain by constipation over the past 4- 5 days  Unfortunately he continued to smoke daily I consult him to stop smoking  His labs and vital signs reviewed  Of notes he was admitted to [facility] on 09/13[facility]09/17 due to CP, depression with decreased oral intake and concurrent methamphetamine use  Upon admission patient also found to have an acute renal failure with creatinine 1 7 He was admitted to telemetry, ACS was ruled out  out with negative troponin  GREG resolved with IV hydration temporary hold on  Lisinopril  Patient was evaluated by Psychiatry and transferred today to [place] for further psych treatment and evaluation  His blood pressure on admission 174/84  Given his normal renal function I will restart his lisinopril tomorrow  Review of Systems:    Review of Systems   Psychiatric/Behavioral:        Depression       Past Medical and Surgical History:     Past Medical History:   Diagnosis Date    Asbestos exposure     Diabetes mellitus ([address] )     Drug use     Hypertension     Lung disease        Past Surgical History:   Procedure Laterality Date    NO PAST SURGERIES         Meds/Allergies:    all medications and allergies reviewed    Allergies:    Allergies   Allergen Reactions    Penicillin V Hives    Breo Ellipta [Fluticasone Furoate-Vilanterol]        Social History:     Marital Status: Single    Substance Use History:   History   Alcohol Use No     History   Smoking Status    Current Every Day Smoker    Packs/day: 2 00   Smokeless Tobacco    Never Used     History   Drug Use    Types: Methamphetamines       Family History:    non-contributory    Physical Exam:     Vitals:   Blood Pressure: (!) 174/84 (09/17/18 1900)  Pulse: 73 (09/17/18 1900)  Temperature: 97 6 °F (36 4 °C) (09/17/18 1900)  Temp Source: Temporal (09/17/18 1900)  Respirations: 19 (09/17/18 1900)  Height: 6' (182 9 cm) (09/17/18 1900)  Weight - Scale: 115 kg (253 lb 14 4 oz) (09/17/18 1900)  SpO2: 96 % (09/17/18 1900)    Physical Exam   Constitutional: No distress  HENT:   Head: Normocephalic  Eyes: Pupils are equal, round, and reactive to light  Neck: Normal range of motion  Cardiovascular: Regular rhythm  Pulmonary/Chest: No respiratory distress  Abdominal: He exhibits distension  There is no tenderness  Musculoskeletal: He exhibits no edema  Neurological: He is alert  Skin: Skin is warm  Additional Data:     Lab Results: I have personally reviewed pertinent reports          Results from last 7 days  Lab Units 09/14/18  0434   WBC Thousand/uL 15 01*   HEMOGLOBIN g/dL 13 6   HEMATOCRIT % 40 8   PLATELETS Thousands/uL 308   NEUTROS PCT % 60   LYMPHS PCT % 28   MONOS PCT % 7   EOS PCT % 3       Results from last 7 days  Lab Units 09/15/18  0453 09/14/18  0434   SODIUM mmol/L 142 142   POTASSIUM mmol/L 3 8 3 6   CHLORIDE mmol/L 109* 107   CO2 mmol/L 24 24   BUN mg/dL 15 28*   CREATININE mg/dL 0 96 1 29   CALCIUM mg/dL 8 1* 8 6   ALK PHOS U/L  --  69   ALT U/L  --  73   AST U/L  --  48*       Results from last 7 days  Lab Units 09/13/18  1552   INR  1 04       Results from last 7 days  Lab Units 09/14/18  0110 09/13/18  2247 09/13/18  1510   TROPONIN I ng/mL <0 02 <0 02 <0 02     Lab Results   Component Value Date/Time    HGBA1C 7 0 (H) 09/13/2018 10:45 PM    HGBA1C 7 0 (H) 05/16/2018 05:00 AM       Results from last 7 days  Lab Units 09/17/18  1624 09/17/18  1120 09/17/18  0748 09/16/18  2113 09/16/18  1556 09/16/18  1137 09/16/18  0743 09/15/18  2105 09/15/18  1525 09/15/18  1108 09/15/18  0716 09/14/18 2029   POC GLUCOSE mg/dl 188* 233* 174* 173* 170* 186* 172* 191* 250* 243* 181* 208*       Imaging: I have personally reviewed pertinent reports  No orders to display     ** Please Note: This note has been constructed using a voice recognition system   **

## 2018-09-18 NOTE — ASSESSMENT & PLAN NOTE
Resolved, no obvious underlying infectious process and most likely secondary to methamphetamine use  Will continue to trend vital signs WBC

## 2018-09-18 NOTE — NURSING NOTE
Patient is currently in bed with eyes closed, respirations even non-labored  No s/s of distress or self-harm noted  SP1 precautions maintained for safety and support

## 2018-09-18 NOTE — ASSESSMENT & PLAN NOTE
Lab Results   Component Value Date    HGBA1C 7 0 (H) 09/13/2018     Continue metformin Accu-Cheks insulin sliding scale  ADA diet    Recent Labs      09/16/18   2113  09/17/18   0748  09/17/18   1120  09/17/18   1624   POCGLU  173*  174*  233*  188*       Blood Sugar Average: Last 72 hrs:

## 2018-09-19 LAB
GLUCOSE SERPL-MCNC: 145 MG/DL (ref 70–99)
GLUCOSE SERPL-MCNC: 159 MG/DL (ref 70–99)
GLUCOSE SERPL-MCNC: 178 MG/DL (ref 70–99)
GLUCOSE SERPL-MCNC: 227 MG/DL (ref 70–99)

## 2018-09-19 PROCEDURE — 82948 REAGENT STRIP/BLOOD GLUCOSE: CPT

## 2018-09-19 RX ORDER — SENNOSIDES 8.6 MG
1 TABLET ORAL
Status: DISCONTINUED | OUTPATIENT
Start: 2018-09-19 | End: 2018-09-20 | Stop reason: HOSPADM

## 2018-09-19 RX ORDER — BISACODYL 10 MG
10 SUPPOSITORY, RECTAL RECTAL DAILY PRN
Status: DISCONTINUED | OUTPATIENT
Start: 2018-09-19 | End: 2018-09-20 | Stop reason: HOSPADM

## 2018-09-19 RX ADMIN — METFORMIN HYDROCHLORIDE 500 MG: 500 TABLET, FILM COATED ORAL at 16:36

## 2018-09-19 RX ADMIN — INSULIN LISPRO 4 UNITS: 100 INJECTION, SOLUTION INTRAVENOUS; SUBCUTANEOUS at 16:37

## 2018-09-19 RX ADMIN — NICOTINE 1 PATCH: 14 PATCH, EXTENDED RELEASE TRANSDERMAL at 08:37

## 2018-09-19 RX ADMIN — OLANZAPINE 5 MG: 5 TABLET, FILM COATED ORAL at 21:09

## 2018-09-19 RX ADMIN — INSULIN LISPRO 2 UNITS: 100 INJECTION, SOLUTION INTRAVENOUS; SUBCUTANEOUS at 11:55

## 2018-09-19 RX ADMIN — INSULIN LISPRO 2 UNITS: 100 INJECTION, SOLUTION INTRAVENOUS; SUBCUTANEOUS at 08:34

## 2018-09-19 RX ADMIN — DOCUSATE SODIUM 100 MG: 100 CAPSULE, LIQUID FILLED ORAL at 16:36

## 2018-09-19 RX ADMIN — METFORMIN HYDROCHLORIDE 500 MG: 500 TABLET, FILM COATED ORAL at 08:35

## 2018-09-19 RX ADMIN — DOCUSATE SODIUM 100 MG: 100 CAPSULE, LIQUID FILLED ORAL at 08:35

## 2018-09-19 RX ADMIN — SENNOSIDES 8.6 MG: 8.6 TABLET, FILM COATED ORAL at 21:09

## 2018-09-19 RX ADMIN — ESCITALOPRAM OXALATE 10 MG: 10 TABLET ORAL at 08:35

## 2018-09-19 RX ADMIN — LISINOPRIL 10 MG: 10 TABLET ORAL at 08:36

## 2018-09-19 RX ADMIN — ASPIRIN 81 MG 81 MG: 81 TABLET ORAL at 08:35

## 2018-09-19 NOTE — PLAN OF CARE
Depression     Refrain from isolation Not Progressing        Ineffective Coping     Participates in unit activities Not Progressing        Risk for Self Injury/Neglect     Attend and participate in unit activities, including therapeutic, recreational, and educational groups Not Progressing          Anxiety     Anxiety is at manageable level Progressing        Depression     Treatment Goal: Demonstrate behavioral control of depressive symptoms, verbalize feelings of improved mood/affect, and adopt new coping skills prior to discharge Progressing     Verbalize thoughts and feelings Progressing     Refrain from harming self Progressing     Refrain from self-neglect Progressing     Attend and participate in unit activities, including therapeutic, recreational, and educational groups Progressing     Complete daily ADLs, including personal hygiene independently, as able Progressing        Ineffective Coping     Identifies ineffective coping skills Progressing     Identifies healthy coping skills Progressing     Demonstrates healthy coping skills Progressing     Patient/Family participate in treatment and DC plans Progressing     Patient/Family verbalizes awareness of resources Progressing     Understands least restrictive measures Progressing     Free from restraint events Progressing        Prexisting or High Potential for Compromised Skin Integrity     Skin integrity is maintained or improved Progressing        Risk for Self Injury/Neglect     Treatment Goal: Remain safe during length of stay, learn and adopt new coping skills, and be free of self-injurious ideation, impulses and acts at the time of discharge Progressing     Verbalize thoughts and feelings Progressing     Refrain from harming self Progressing     Recognize maladaptive responses and adopt new coping mechanisms Progressing     Complete daily ADLs, including personal hygiene independently, as able Progressing

## 2018-09-19 NOTE — PROGRESS NOTES
Observed on SP1 Q 15 minute safety checks  No self harming actions noted  Currently observed in bed with even respirations and eyes closed , appears to be sleeping  Voicing no SI's   In no distress

## 2018-09-19 NOTE — PROGRESS NOTES
Patient is awake, alert, and oriented  Patient is compliant with medication and meals  Patient denies SI/VH/AHs at this time  Patient is pleasant and cooperative upon approach  SP1 precautions maintained

## 2018-09-19 NOTE — PLAN OF CARE
Depression     Refrain from isolation Not Progressing        Ineffective Coping     Participates in unit activities Not Progressing        Risk for Self Injury/Neglect     Attend and participate in unit activities, including therapeutic, recreational, and educational groups Not Progressing

## 2018-09-19 NOTE — PROGRESS NOTES
Psychiatry Progress Note    Subjective: Interval History     The patient is pleasant and visible on the unit this morning  He states he did go to one of the groups yesterday  Patient was restarted on Lexapro yesterday  He states this medication worked well for him in the past however he did not follow up and get refills  He states then he started getting very depressed and was using methamphetamine  Patient denies suicidal or homicidal ideations currently  He denies any hallucinations  He states he slept well        Current medications:    Current Facility-Administered Medications:     acetaminophen (TYLENOL) tablet 650 mg, 650 mg, Oral, Q4H PRN, Claudette Llamas, PA-C    albuterol (PROVENTIL HFA,VENTOLIN HFA) inhaler 2 puff, 2 puff, Inhalation, Q6H PRN, Jadiel Kamara MD    aspirin chewable tablet 81 mg, 81 mg, Oral, Daily, Lobito Bob MD, 81 mg at 09/18/18 0820    docusate sodium (COLACE) capsule 100 mg, 100 mg, Oral, BID, Jadiel Kamara MD, 100 mg at 09/18/18 1706    escitalopram (LEXAPRO) tablet 10 mg, 10 mg, Oral, Daily, Sylvia Allan PA-C, 10 mg at 09/18/18 1048    insulin lispro (HumaLOG) 100 units/mL subcutaneous injection 2-12 Units, 2-12 Units, Subcutaneous, TID AC, 2 Units at 09/18/18 1725 **AND** Fingerstick Glucose (POCT), , , TID AC, Jadiel Kamara MD    insulin lispro (HumaLOG) 100 units/mL subcutaneous injection 2-12 Units, 2-12 Units, Subcutaneous, HS, Jadiel Kamara MD, 2 Units at 09/18/18 2113    lisinopril (ZESTRIL) tablet 10 mg, 10 mg, Oral, Daily, Jadiel Kamara MD, 10 mg at 09/18/18 0820    LORazepam (ATIVAN) 2 mg/mL injection 0 5 mg, 0 5 mg, Intramuscular, Q4H PRN, Claudette Llamas, PA-C    LORazepam (ATIVAN) tablet 0 5 mg, 0 5 mg, Oral, Q4H PRN, Claudette Llamas, PA-C    melatonin tablet 3 mg, 3 mg, Oral, HS PRN, Jadiel Kamara MD    metFORMIN (GLUCOPHAGE) tablet 500 mg, 500 mg, Oral, BID With Meals, Lobito Bob MD, 500 mg at 09/18/18 6251   nicotine (NICODERM CQ) 14 mg/24hr TD 24 hr patch 1 patch, 1 patch, Transdermal, Daily, Taylor Shook PA-C, 1 patch at 09/18/18 0820    nitroglycerin (NITROSTAT) SL tablet 0 4 mg, 0 4 mg, Sublingual, Q5 Min PRN, Alma Delia Rodríguez MD    OLANZapine (ZyPREXA) IM injection 5 mg, 5 mg, Intramuscular, Q8H PRN, Taylor Shook PA-C    OLANZapine (ZyPREXA) tablet 5 mg, 5 mg, Oral, HS, Alma Delia Rodríguez MD, 5 mg at 09/18/18 2113    OLANZapine (ZyPREXA) tablet 5 mg, 5 mg, Oral, Q8H PRN, Taylor Shook PA-C    polyethylene glycol (MIRALAX) packet 17 g, 17 g, Oral, Daily PRN, Marilia Lyon MD, 17 g at 09/18/18 0819    traZODone (DESYREL) tablet 50 mg, 50 mg, Oral, HS PRN, Taylor Shook PA-C      Objective:     Vital Signs:  Vitals:    09/17/18 1900 09/18/18 0705 09/18/18 1512 09/19/18 0654   BP: (!) 174/84 122/57 163/74 118/59   BP Location: Right arm Left arm Left arm Right arm   Pulse: 73 64 80 64   Resp: 19 18 20 18   Temp: 97 6 °F (36 4 °C) (!) 96 8 °F (36 °C) 97 6 °F (36 4 °C) (!) 96 5 °F (35 8 °C)   TempSrc: Temporal Temporal Temporal Temporal   SpO2: 96% 94% 94% 95%   Weight: 115 kg (253 lb 14 4 oz)      Height: 6' (1 829 m)            Appearance:  age appropriate and casually dressed   Behavior:  normal   Speech:  normal volume   Mood:  anxious and depressed   Affect:  constricted   Thought Process:  normal   Thought Content:  normal   Perceptual Disturbances: None   Risk Potential: none   Sensorium:  person, place, situation and time   Cognition:  intact   Consciousness:  alert and awake    Attention: attention span and concentration were age appropriate   Intellect: average   Insight:  fair   Judgment: fair      Motor Activity: no abnormal movements           Recent Labs:  Results Reviewed     None          I/O Past 24 hours:  I/O last 3 completed shifts: In: 720 [P O :720]  Out: -   No intake/output data recorded          Assessment / Plan:     Major depression, recurrent ([facility] )    Recommended Treatment:      Medication changes:  1) continue current medication regimen  Lexapro started yesterday  Non-pharmacological treatments  1) Continue with group therapy, milieu therapy and occupational therapy  Safety  1) Safety/communication plan established targeting dynamic risk factors above  2) Risks, benefits, and possible side effects of medications explained to patient and patient verbalizes understanding  Counseling / Coordination of Care    Total floor / unit time spent today 20 minutes  Greater than 50% of total time was spent with the patient and / or family counseling and / or coordination of care  A description of the counseling / coordination of care  Patient's Rights, confidentiality and exceptions to confidentiality, use of automated medical record, [address] [place] [address] staff access to medical record, and consent to treatment reviewed      Magno Hickman PA-C
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
## 2018-09-19 NOTE — PROGRESS NOTES
Pt visible on unit  Alert and oriented x4  Med and meal compliant  Denies pain  Denies Sis at this time  Will continue to monitor on SP1 precautions for safety and support

## 2018-09-19 NOTE — PROGRESS NOTES
Awake, alert, oriented  Blunted, depressed, anxious at times, isolative to self at times  Medication compliant  Denies any SI/HI at this time  Denies any AH/VH at this time  Currently resting calmly in bed  Will continue to monitor

## 2018-09-20 VITALS
DIASTOLIC BLOOD PRESSURE: 74 MMHG | HEIGHT: 72 IN | RESPIRATION RATE: 18 BRPM | BODY MASS INDEX: 34.39 KG/M2 | SYSTOLIC BLOOD PRESSURE: 135 MMHG | WEIGHT: 253.9 LBS | HEART RATE: 73 BPM | TEMPERATURE: 96.4 F | OXYGEN SATURATION: 93 %

## 2018-09-20 LAB
GLUCOSE SERPL-MCNC: 169 MG/DL (ref 70–99)
GLUCOSE SERPL-MCNC: 170 MG/DL (ref 70–99)

## 2018-09-20 PROCEDURE — 97165 OT EVAL LOW COMPLEX 30 MIN: CPT

## 2018-09-20 PROCEDURE — 82948 REAGENT STRIP/BLOOD GLUCOSE: CPT

## 2018-09-20 RX ORDER — LISINOPRIL 10 MG/1
10 TABLET ORAL DAILY
Qty: 30 TABLET | Refills: 0 | Status: SHIPPED | OUTPATIENT
Start: 2018-09-21

## 2018-09-20 RX ORDER — OLANZAPINE 5 MG/1
5 TABLET ORAL
Qty: 30 TABLET | Refills: 0 | Status: SHIPPED | OUTPATIENT
Start: 2018-09-20

## 2018-09-20 RX ORDER — ASPIRIN 81 MG/1
81 TABLET, CHEWABLE ORAL DAILY
Qty: 30 TABLET | Refills: 0 | Status: SHIPPED | OUTPATIENT
Start: 2018-09-21

## 2018-09-20 RX ORDER — DOCUSATE SODIUM 100 MG/1
100 CAPSULE, LIQUID FILLED ORAL 2 TIMES DAILY
Qty: 60 CAPSULE | Refills: 0 | Status: SHIPPED | OUTPATIENT
Start: 2018-09-20 | End: 2018-12-03

## 2018-09-20 RX ORDER — ESCITALOPRAM OXALATE 10 MG/1
10 TABLET ORAL DAILY
Qty: 30 TABLET | Refills: 0 | Status: SHIPPED | OUTPATIENT
Start: 2018-09-21

## 2018-09-20 RX ORDER — ALBUTEROL SULFATE 90 UG/1
2 AEROSOL, METERED RESPIRATORY (INHALATION) EVERY 6 HOURS PRN
Qty: 1 INHALER | Refills: 0 | Status: SHIPPED | OUTPATIENT
Start: 2018-09-20

## 2018-09-20 RX ORDER — SENNOSIDES 8.6 MG
1 TABLET ORAL
Qty: 30 EACH | Refills: 0 | Status: SHIPPED | OUTPATIENT
Start: 2018-09-20 | End: 2018-12-03

## 2018-09-20 RX ADMIN — METFORMIN HYDROCHLORIDE 500 MG: 500 TABLET, FILM COATED ORAL at 08:26

## 2018-09-20 RX ADMIN — NICOTINE 1 PATCH: 14 PATCH, EXTENDED RELEASE TRANSDERMAL at 08:26

## 2018-09-20 RX ADMIN — INSULIN LISPRO 2 UNITS: 100 INJECTION, SOLUTION INTRAVENOUS; SUBCUTANEOUS at 08:25

## 2018-09-20 RX ADMIN — DOCUSATE SODIUM 100 MG: 100 CAPSULE, LIQUID FILLED ORAL at 08:27

## 2018-09-20 RX ADMIN — ASPIRIN 81 MG 81 MG: 81 TABLET ORAL at 08:27

## 2018-09-20 RX ADMIN — ESCITALOPRAM OXALATE 10 MG: 10 TABLET ORAL at 08:27

## 2018-09-20 RX ADMIN — LISINOPRIL 10 MG: 10 TABLET ORAL at 08:27

## 2018-09-20 NOTE — DISCHARGE INSTR - APPOINTMENTS
Follow-up on 9/21 during walk-in hours at:  [address]   [facility]   [phone]    Walk-in hours are Monday - Friday 9-3   Bring Photo ID  Insurance Card  List of medications    Initial assessment will take place at the first appointment and they will assess the need for a dual diagnosis program

## 2018-09-20 NOTE — PROGRESS NOTES
Observed in bed, eyes closed and even respirations noted, appears to be sleeping   Monitored on Q 15 minute SP1 safety checks, no self harming actions noted, not voicing any SI's

## 2018-09-20 NOTE — PROGRESS NOTES
Discharge directions reviewed and questions answered  Discharge instructions, belongings, valuables, and prescriptions sent with pt  Escorted to main lobby by staff via wheelchair

## 2018-09-20 NOTE — OCCUPATIONAL THERAPY NOTE
Occupational Therapy Evaluation      Orjanie Danisha    9/20/2018    Patient Active Problem List   Diagnosis    SIRS (systemic inflammatory response syndrome) (HCC)    Elevated d-dimer    Methamphetamine use    Suicidal ideation    Metabolic acidosis    GREG (acute kidney injury) ([facility] )    Diabetes ([facility] )    Auditory hallucinations    Chest pain    PTSD (post-traumatic stress disorder)    Major depression, recurrent ([facility] )    Accelerated hypertension    Type 2 diabetes mellitus without complication, with long-term current use of insulin ([facility] )    Asbestos exposure    Smoker    Class 1 obesity due to excess calories in adult       Past Medical History:   Diagnosis Date    Asbestos exposure     Diabetes mellitus ([facility] )     Drug use     Hypertension     Lung disease        Past Surgical History:   Procedure Laterality Date    NO PAST SURGERIES          09/20/18 0900   Note Type   Note type Eval only   Restrictions/Precautions   Other Precautions Suicidal  (was on this at admission, now discontinued)   Pain Assessment   Pain Assessment No/denies pain   Pain Score No Pain   Home Living   Type of [address] (2nd and 3rd floor apt, stairs with rails to enter)   Additional Comments He reports independence in ambulation, using steps   Prior Function   Level of [place] Independent with ADLs and functional mobility   Lives With Family  (lives with sister)   Maggie [facility] [address] in the last 6 months 1 to 4  (recent fall where he reports he tore hamstring on his L LE)   Vocational Full time employment   Comments He works full time as a ; He stated that he is currently on short term disability, he is scheduled to return to work Oct  4 2018; he did state that prior this hospitalization, he was going for out patient PT   He stated that he is feeling better, that he came to hospital due to relapse but feels ready to leave  Per intake, he came to the ER reporting auditory and visual hallucinations, paranoia, suicidal     Lifestyle   Autonomy He lives with his sister, works full time (he stated that he has frequently worked overtime)   Reciprocal Relationships sister   Intrinsic Gratification He stated that he likes to fish, he stated that he has had recent opportunities to do this   Psychosocial   Psychosocial (WDL) WDL   Patient Behaviors/Mood Appropriate for situation;Calm; Cooperative;Pleasant  (reports that he will probably be leaving the hospital soon)   Ability to Express Feelings Able to express   Ability to Express Needs Able to express   Ability to Express Thoughts Able to express   Ability to Understand Others Understands   Subjective   Subjective Per reason for hospitalization, "I relapsed on metamphetamines "   ADL   Additional Comments He is independent in personal care  (daily cares notes indendence in hygiene, feeding self)   Transfers   Additional Comments he ambulates, transfers independently   Functional Mobility   Additional Comments He ambulates independently   Activity Tolerance   Activity Tolerance Patient tolerated treatment well   RUE Assessment   RUE Assessment WFL   LUE Assessment   LUE Assessment WFL   Hand Function   Gross Motor Coordination Functional   Fine Motor Coordination Functional   Vision-Basic Assessment   Current Vision Other (Comment)  (he stated that he wears glasses, these were not with him)   Cognition   Overall Cognitive Status [facility]   Arousal/Participation Alert; Responsive;Arousable; Cooperative   Orientation Level Oriented X4   Memory Within functional limits   Following Commands Follows multistep commands inconsistently   Comments He was easily able to carry out 1-2 step whip stitch task and error correct on this  He was willing to attempt multistep single cordovan task, he did notice errors, did require assistance for error correction  He was calm pleasant during the interview  Frustration tolerance was good  Assessment   Limitation Mood limitation  (decreased coping skills, decreased life management strategie)   Prognosis Good   Assessment Pt is a 59 y o  male seen for OT evaluation s/p admit to [facility] on 9/17/2018 w/ Major depression, recurrent ([address] )  Comorbidities affecting pt's functional performance at time of assessment include: DM, HTN, obesity and chronic metaamphetamine use, GREG, recurrent SIRS, chest pain, PTSD, asbestos exposure, smoker  Personal factors affecting pt at time of IE include:behavioral pattern, compliance, health management  and decreased coping skills, reorted torn hamstring in L LE  Prior to admission, pt was living with his sister  Upon evaluation: Pt is in process of being discharged on this day  He reports that he is feeling better  He hopes to return to work in early October  He admits to metaamphetamine use prior this admission  No OT involvement is suggested at this time due to discharge  He will be returning to living with his sisster who is a support  Goals   Patient Goals "To maintain my sobriety and stay on my medications     LTG Time Frame (He is being discharged on this day)   Recommendation   OT Discharge Recommendation Home independent   OT - OK to Discharge Yes   Shayla Lazcano, OT

## 2018-09-20 NOTE — PROGRESS NOTES
Awake, alert, oriented  Blunted, depressed, isolative to self  Pleasant and cooperative on approach  Denies any SI/HI at this time  Denies any AH/VH at this time  Medication compliant  Pt with no complaints at this time  Will continue to monitor and encourage socialization

## 2018-09-20 NOTE — PROGRESS NOTES
Slept through the night  Monitored on SP1 Q 15 minute safety checks  Not voicing any SI's, no self harming actions noted

## 2018-09-20 NOTE — PLAN OF CARE
Depression     Attend and participate in unit activities, including therapeutic, recreational, and educational groups Not Progressing        Ineffective Coping     Participates in unit activities Not Progressing        Risk for Self Injury/Neglect     Attend and participate in unit activities, including therapeutic, recreational, and educational groups Not Progressing

## 2018-09-20 NOTE — PLAN OF CARE

## 2018-09-20 NOTE — DISCHARGE SUMMARY
Psychiatric Discharge Summary    Medical Record Number: [id number]  Encounter: [id number]    Discharge diagnosis:  Major depression, recurrent ([place] [address] )    Secondary diagnoses:  Problem List     * (Principal)Major depression, recurrent ([place] [address] )    SIRS (systemic inflammatory response syndrome) (HCC)    Elevated d-dimer    Methamphetamine use (Chronic)    Suicidal ideation    Metabolic acidosis    GREG (acute kidney injury) ([place] [address] )    Diabetes ([place] [address] )    Lab Results   Component Value Date    HGBA1C 7 0 (H) 09/13/2018       Recent Labs      09/19/18   1104  09/19/18   1607  09/19/18 1953 09/20/18   0733   POCGLU  159*  227*  145*  170*       Blood Sugar Average: Last 72 hrs:  (P) 181        Auditory hallucinations    Chest pain    PTSD (post-traumatic stress disorder)    Accelerated hypertension    Type 2 diabetes mellitus without complication, with long-term current use of insulin (HCC) (Chronic)    Lab Results   Component Value Date    HGBA1C 7 0 (H) 09/13/2018       Recent Labs      09/19/18   1104  09/19/18   1607  09/19/18 1953 09/20/18   0733   POCGLU  159*  227*  145*  170*       Blood Sugar Average: Last 72 hrs:  (P) 181        Asbestos exposure (Chronic)    Smoker (Chronic)    Class 1 obesity due to excess calories in adult (Chronic)          HPI:     Susie Kaye is an 59 y o , male, admitted to the psychiatric unit under a 201 voluntary status to Dr Bharath Velásquez' service with the chief complaint of depression, suicidal ideations, hallucinations due to methamphetamine use  The patient originally was at [address] [place] [address] on September 14 due to snorting methamphetamine and becoming paranoid and having visual hallucinations  Patient was diagnosed with sirs possible from acute methamphetamine use  There is no signs or symptoms of acute infection    Once the patient was medically stable he was transferred to the older adult psychiatric unit at [facility] for further evaluation and treatment      The patient is alert and oriented x4  He states he has been more depressed recently since he ran out of his Lexapro  He states he has been off of this for about 2 months  He states when he was off of his medication he started feeling more irritable and depressed and then started using methamphetamine again  He states when he used methamphetamine he started hallucinating and seeing snakes chasing him  He felt very paranoid  He states he does not feel this way when he is not using methamphetamine  He states he has had about 5 rehab stays due to methamphetamine  He continues to work as a   He states he never followed up at mental health in [address] [place] [address] for more of his medication  Patient is living with his sister and nephews  He states his sister is very supportive  He denies other drug use or alcohol use  He states he has PTSD due to being in the [facility] and from his childhood  The patient states he has had an issue on and off for years with methamphetamine  He does not wish to do inpatient rehab at this time  He states he wants to restart lexapro because when he was on it he felt well and did not have the urge to use  Patient states he did sleep last night  He denies suicidal or homicidal ideations currently  He denies hallucinations  Brief Hospital Course:     After the patient was admitted to the psychiatric unit  the patient was restarted on lexapro and given zyprexa  Following these medication changes, the patient started to stabilize  Finally on 9/20/2018, the patient was found to be stable for discharge  On the day of discharge the patient reported their symptoms as significantly improved  All psychiatric medications were being tolerated without side effect or adverse event  No suicidal or homicidal ideations were present  The patient expressed their readiness and willingness to be discharged and referral to outpatient treatment was made    The case was discussed with Dr Colletta Chesterfield and he has deemed the patient stable for discharge        Condition on discharge:     Improved    Medications Upon Discharge:         aspirin chewable tablet 81 mg, 81 mg, Oral, Daily, Cheli Tovar MD, 81 mg at 09/20/18 0827    docusate sodium (COLACE) capsule 100 mg, 100 mg, Oral, BID, Horace Sacks, MD, 100 mg at 09/20/18 0827    escitalopram (LEXAPRO) tablet 10 mg, 10 mg, Oral, Daily, Tiny Abrams PA-C, 10 mg at 09/20/18 0827    lisinopril (ZESTRIL) tablet 10 mg, 10 mg, Oral, Daily, Horace Sacks, MD, 10 mg at 09/20/18 0827    metFORMIN (GLUCOPHAGE) tablet 500 mg, 500 mg, Oral, BID With Meals, Cheli Tovar MD, 500 mg at 09/20/18 0826    OLANZapine (ZyPREXA) tablet 5 mg, 5 mg, Oral, HS, Cheli Tovar MD, 5 mg at 09/19/18 2109    senna (SENOKOT) tablet 8 6 mg, 1 tablet, Oral, HS, Tiny Abrams PA-C, 8 6 mg at 09/19/18 2109    Tiny Abrams PA-C

## 2018-09-21 NOTE — SOCIAL WORK
Late Entry:   Patient is being discharged, no SI/HI, no psychosis or A/V  Patient is in agreement with discharge  No questions verbalized  Patient provided with after care appointment, discharge instructions and prescriptions  IMM, core measures, transition of care, DC instructions, and treatment plan completed   Patient will leave by taxi cab at 12:00pm

## 2018-12-03 ENCOUNTER — APPOINTMENT (EMERGENCY)
Dept: RADIOLOGY | Facility: HOSPITAL | Age: 63
End: 2018-12-03
Payer: COMMERCIAL

## 2018-12-03 ENCOUNTER — HOSPITAL ENCOUNTER (EMERGENCY)
Facility: HOSPITAL | Age: 63
Discharge: HOME | End: 2018-12-03
Attending: EMERGENCY MEDICINE
Payer: COMMERCIAL

## 2018-12-03 VITALS
TEMPERATURE: 98.6 F | OXYGEN SATURATION: 96 % | WEIGHT: 236.77 LBS | RESPIRATION RATE: 17 BRPM | SYSTOLIC BLOOD PRESSURE: 121 MMHG | BODY MASS INDEX: 32.11 KG/M2 | DIASTOLIC BLOOD PRESSURE: 61 MMHG | HEART RATE: 89 BPM

## 2018-12-03 DIAGNOSIS — F15.10 METHAMPHETAMINE ABUSE (HCC): ICD-10-CM

## 2018-12-03 DIAGNOSIS — F32.A DEPRESSED: Primary | ICD-10-CM

## 2018-12-03 DIAGNOSIS — R07.89 CHEST DISCOMFORT: ICD-10-CM

## 2018-12-03 LAB
ALBUMIN SERPL BCP-MCNC: 3.9 G/DL (ref 3.5–5)
ALP SERPL-CCNC: 92 U/L (ref 46–116)
ALT SERPL W P-5'-P-CCNC: 72 U/L (ref 12–78)
ANION GAP SERPL CALCULATED.3IONS-SCNC: 18 MMOL/L (ref 4–13)
AST SERPL W P-5'-P-CCNC: 82 U/L (ref 5–45)
BASOPHILS # BLD AUTO: 0.06 THOUSANDS/ΜL (ref 0–0.1)
BASOPHILS NFR BLD AUTO: 0 % (ref 0–1)
BILIRUB SERPL-MCNC: 0.76 MG/DL (ref 0.2–1)
BUN SERPL-MCNC: 26 MG/DL (ref 5–25)
CALCIUM SERPL-MCNC: 9.2 MG/DL (ref 8.3–10.1)
CHLORIDE SERPL-SCNC: 99 MMOL/L (ref 100–108)
CO2 SERPL-SCNC: 20 MMOL/L (ref 21–32)
CREAT SERPL-MCNC: 1.52 MG/DL (ref 0.6–1.3)
EOSINOPHIL # BLD AUTO: 0.17 THOUSAND/ΜL (ref 0–0.61)
EOSINOPHIL NFR BLD AUTO: 1 % (ref 0–6)
ERYTHROCYTE [DISTWIDTH] IN BLOOD BY AUTOMATED COUNT: 13.6 % (ref 11.6–15.1)
ETHANOL EXG-MCNC: 0 MG/DL
GFR SERPL CREATININE-BSD FRML MDRD: 48 ML/MIN/1.73SQ M
GLUCOSE SERPL-MCNC: 218 MG/DL (ref 65–140)
HCT VFR BLD AUTO: 45 % (ref 36.5–49.3)
HGB BLD-MCNC: 14.6 G/DL (ref 12–17)
IMM GRANULOCYTES # BLD AUTO: 0.08 THOUSAND/UL (ref 0–0.2)
IMM GRANULOCYTES NFR BLD AUTO: 1 % (ref 0–2)
LYMPHOCYTES # BLD AUTO: 2.65 THOUSANDS/ΜL (ref 0.6–4.47)
LYMPHOCYTES NFR BLD AUTO: 17 % (ref 14–44)
MCH RBC QN AUTO: 28.2 PG (ref 26.8–34.3)
MCHC RBC AUTO-ENTMCNC: 32.4 G/DL (ref 31.4–37.4)
MCV RBC AUTO: 87 FL (ref 82–98)
MONOCYTES # BLD AUTO: 1.21 THOUSAND/ΜL (ref 0.17–1.22)
MONOCYTES NFR BLD AUTO: 8 % (ref 4–12)
NEUTROPHILS # BLD AUTO: 11.22 THOUSANDS/ΜL (ref 1.85–7.62)
NEUTS SEG NFR BLD AUTO: 73 % (ref 43–75)
NRBC BLD AUTO-RTO: 0 /100 WBCS
NT-PROBNP SERPL-MCNC: 38 PG/ML
PLATELET # BLD AUTO: 318 THOUSANDS/UL (ref 149–390)
PMV BLD AUTO: 9.8 FL (ref 8.9–12.7)
POTASSIUM SERPL-SCNC: 3.6 MMOL/L (ref 3.5–5.3)
PROT SERPL-MCNC: 8 G/DL (ref 6.4–8.2)
RBC # BLD AUTO: 5.17 MILLION/UL (ref 3.88–5.62)
SODIUM SERPL-SCNC: 137 MMOL/L (ref 136–145)
TROPONIN I SERPL-MCNC: <0.02 NG/ML
TSH SERPL DL<=0.05 MIU/L-ACNC: 2.21 UIU/ML (ref 0.36–3.74)
WBC # BLD AUTO: 15.39 THOUSAND/UL (ref 4.31–10.16)

## 2018-12-03 PROCEDURE — 99285 EMERGENCY DEPT VISIT HI MDM: CPT

## 2018-12-03 PROCEDURE — 71045 X-RAY EXAM CHEST 1 VIEW: CPT

## 2018-12-03 PROCEDURE — 93005 ELECTROCARDIOGRAM TRACING: CPT

## 2018-12-03 PROCEDURE — 84484 ASSAY OF TROPONIN QUANT: CPT | Performed by: EMERGENCY MEDICINE

## 2018-12-03 PROCEDURE — 83880 ASSAY OF NATRIURETIC PEPTIDE: CPT | Performed by: EMERGENCY MEDICINE

## 2018-12-03 PROCEDURE — 80053 COMPREHEN METABOLIC PANEL: CPT | Performed by: EMERGENCY MEDICINE

## 2018-12-03 PROCEDURE — 36415 COLL VENOUS BLD VENIPUNCTURE: CPT

## 2018-12-03 PROCEDURE — 85025 COMPLETE CBC W/AUTO DIFF WBC: CPT | Performed by: EMERGENCY MEDICINE

## 2018-12-03 PROCEDURE — 84443 ASSAY THYROID STIM HORMONE: CPT | Performed by: EMERGENCY MEDICINE

## 2018-12-03 PROCEDURE — 82075 ASSAY OF BREATH ETHANOL: CPT | Performed by: EMERGENCY MEDICINE

## 2018-12-03 RX ORDER — LISINOPRIL 5 MG/1
10 TABLET ORAL ONCE
Status: DISCONTINUED | OUTPATIENT
Start: 2018-12-03 | End: 2018-12-03

## 2018-12-03 RX ORDER — PANTOPRAZOLE SODIUM 40 MG/1
40 TABLET, DELAYED RELEASE ORAL ONCE
Status: DISCONTINUED | OUTPATIENT
Start: 2018-12-03 | End: 2018-12-03

## 2018-12-03 RX ORDER — ASPIRIN 81 MG/1
324 TABLET, CHEWABLE ORAL ONCE
Status: COMPLETED | OUTPATIENT
Start: 2018-12-03 | End: 2018-12-03

## 2018-12-03 RX ORDER — IBUPROFEN 600 MG/1
600 TABLET ORAL ONCE
Status: COMPLETED | OUTPATIENT
Start: 2018-12-03 | End: 2018-12-03

## 2018-12-03 RX ORDER — PANTOPRAZOLE SODIUM 40 MG/1
40 TABLET, DELAYED RELEASE ORAL DAILY
COMMUNITY

## 2018-12-03 RX ORDER — REPAGLINIDE 2 MG/1
2 TABLET ORAL
Status: DISCONTINUED | OUTPATIENT
Start: 2018-12-03 | End: 2018-12-03

## 2018-12-03 RX ORDER — LORAZEPAM 0.5 MG/1
0.5 TABLET ORAL ONCE
Status: DISCONTINUED | OUTPATIENT
Start: 2018-12-03 | End: 2018-12-03

## 2018-12-03 RX ORDER — ROSUVASTATIN CALCIUM 5 MG/1
5 TABLET, COATED ORAL
COMMUNITY

## 2018-12-03 RX ORDER — TRAZODONE HYDROCHLORIDE 50 MG/1
50 TABLET ORAL
COMMUNITY

## 2018-12-03 RX ORDER — REPAGLINIDE 2 MG/1
2 TABLET ORAL
COMMUNITY

## 2018-12-03 RX ORDER — TRAMADOL HYDROCHLORIDE 50 MG/1
50 TABLET ORAL AS NEEDED
COMMUNITY

## 2018-12-03 RX ORDER — ACETAMINOPHEN 325 MG/1
650 TABLET ORAL ONCE
Status: COMPLETED | OUTPATIENT
Start: 2018-12-03 | End: 2018-12-03

## 2018-12-03 RX ADMIN — ACETAMINOPHEN 650 MG: 325 TABLET, FILM COATED ORAL at 06:36

## 2018-12-03 RX ADMIN — ASPIRIN 81 MG 324 MG: 81 TABLET ORAL at 06:55

## 2018-12-03 RX ADMIN — IBUPROFEN 600 MG: 600 TABLET, FILM COATED ORAL at 06:36

## 2018-12-03 NOTE — DISCHARGE INSTRUCTIONS
Methamphetamine Abuse   AMBULATORY CARE:   Methamphetamine (meth) abuse  is any use of meth, or needing more meth for the same effects you got from smaller amounts  Common symptoms include the following:   · Fast or fluttering heartbeat, and chest pain    · Dilated pupils    · Fast breathing    · Nausea and vomiting    · Fever, chills, and sweats    · Ringing in your ears or grinding your teeth    · Confusion or hallucinations    · Seizure or coma  Seek care immediately if:   · You have chest pain, and your heartbeat or breathing is faster than usual     · You are so nervous that you cannot function  · You feel sick or vomit, or have headaches or trouble breathing while being around or cooking meth  You may also feel dizzy  · Children or others who have been near meth look or act ill, or will not wake up  · You have a seizure  · You want to hurt yourself or someone else  Contact your healthcare provider if:   · You have a fever  · You are using meth and know or think you may be pregnant  · You have withdrawal symptoms and want to start using meth again  · You have questions or concerns about your condition or care  Meth withdrawal  occurs when you decrease or stop using meth  You may have trouble coping with the symptoms of withdrawal  Withdrawal signs and symptoms go away in days to weeks after you stop using meth  Meth withdrawal can cause the following signs and symptoms:  · Seizures    · Feeling sad or wanting to kill yourself    · Strong cravings for meth    · Feeling tired, sleeping longer than usual or not being able sleep at all, or bad dreams    · Trouble focusing on a task, moving more slowly and taking longer to complete tasks, or feeling restless    · Feeling nervous, angry, hungry, or unwell, or thinking people are trying to hurt you  Treatment for meth abuse:  A monitor will be used to check your heart  You may be given treatments to decrease a high body temperature   You may also need any of the following:  · Medicines  may be given to absorb the drug if you swallowed meth, or to lower your blood pressure  Medicines may be given to help you stay calm, reduce depression, or decrease false thoughts  · Contingency management  is a program to help meth users stop using drugs by giving rewards  You may be rewarded for staying in treatment or giving drug-free urine samples  You may get rewards for having STI or tuberculosis tests, or getting vaccines to help decrease the spread of disease  Rewards may include vouchers to buy food  · Cognitive behavioral therapy (CBT)  helps you change your thinking and behavior  It can help you manage depression and anxiety caused by meth use  CBT can help you learn good coping skills and ways to manage stress  CBT can be done with you and a talk therapist or in a group with others  · Family therapy and support groups  are meetings with a talk therapist and other people who have used meth or other drugs  Your friends and family may be asked to attend treatment sessions with you  Programs near where you live may support your choice to quit using drugs  Ask your healthcare provider for information about programs in your town  · Harm reduction  is a program to help support your choice to prevent spreading disease  You may be able to return used needles and syringes and replace them with clean ones  Long-term effects of meth abuse:   · Memory and concentration problems  can make it hard to learn or remember information  You may feel confused  You may also do things more slowly than before  · Behavior problems  may include violent or impulsive actions  Impulsive means you act without thinking first      · Physical problems  include heart weakness or damage  Your heart may have trouble working correctly  Men may have a decreased ability to have sex      · Self-care problems  include not keeping yourself clean and not eating properly because you are focused on using meth  Meth may cause you to look older than you really are  · Skin problems  may happen if you start picking at your skin or do not care for needle marks  You may think you see or feel bugs on or under your skin and try to pick them off  Skin picking causes sores to grow, and the sores can get infected  Meth injection causes needle marks on your skin  Needle marks can also get infected  · Mouth problems  can develop from meth use  Meth can cause dry mouth and make you chew, clench, or grind your teeth more than normal  This causes your teeth to wear down  Your teeth may turn dark or black  They may break, crumble, or fall apart  Your teeth may need to be pulled out  Follow up with your healthcare provider as directed:  Write down your questions so you remember to ask them during your visits  © 2017 [address] Information is for End User's use only and may not be sold, redistributed or otherwise used for commercial purposes  All illustrations and images included in CareNotes® are the copyrighted property of [employer]  or [employer]ahue  The above information is an  only  It is not intended as medical advice for individual conditions or treatments  Talk to your doctor, nurse or pharmacist before following any medical regimen to see if it is safe and effective for you  Chest Pain, Ambulatory Care   GENERAL INFORMATION:   Chest pain  can be caused by a range of conditions, from not serious to life-threatening  It may be caused by a heart attack or a blood clot in your lungs  Sometimes chest pain or pressure is caused by poor blood flow to your heart (angina)  Infection, inflammation, or a fracture in the bones or cartilage in your chest can cause pain or discomfort  Chest pain can also be a symptom of a digestive problem, such as acid reflux or a stomach ulcer     Common symptoms include the following:   · Fever or sweating     · Nausea or vomiting · Shortness of breath     · Discomfort or pressure that spreads from your chest to your back, jaw, or arm     · A racing or slow heartbeat     · Feeling weak, tired, or faint  Seek immediate care for the following symptoms:   · Any of the following signs of a heart attack:      ¨ Squeezing, pressure, or pain in your chest that lasts longer than 5 minutes or returns    ¨ Discomfort or pain in your back, neck, jaw, stomach, or arm     ¨ Trouble breathing     ¨ Nausea or vomiting    ¨ Lightheadedness or a sudden cold sweat, especially with trouble breathing         · Chest discomfort that gets worse, even with medicine    · Coughing or vomiting blood    · Black or bloody bowel movements     · Vomiting that does not stop, or pain when you swallow  Treatment for chest pain  may include medicine to treat your symptoms while he determines the cause of your chest pain  You may also need any of the following:  · Antiplatelets , such as aspirin, help prevent blood clots  Take your antiplatelet medicine exactly as directed  These medicines make it more likely for you to bleed or bruise  If you are told to take aspirin, do not take acetaminophen or ibuprofen instead  · Prescription pain medicine  may be given  Ask how to take this medicine safely  Do not smoke: If you smoke, it is never too late to quit  Smoking increases your risk for a heart attack and other heart and lung conditions  Ask your healthcare provider for information about how to stop smoking if you need help  Follow up with your healthcare provider as directed: You may need more tests  You may be referred to a specialist, such as a cardiologist or gastroenterologist  Write down your questions so you remember to ask them during your visits  CARE AGREEMENT:   You have the right to help plan your care  Learn about your health condition and how it may be treated  Discuss treatment options with your caregivers to decide what care you want to receive  You always have the right to refuse treatment  The above information is an  only  It is not intended as medical advice for individual conditions or treatments  Talk to your doctor, nurse or pharmacist before following any medical regimen to see if it is safe and effective for you  © 2014 [address] is for End User's use only and may not be sold, redistributed or otherwise used for commercial purposes  All illustrations and images included in CareNotes® are the copyrighted property of A D A M [employer] Inc  or [employer] Donahue

## 2018-12-03 NOTE — ED NOTES
Assumed care of patient at this time  Patient awake and alert  Will continue to monitor        Amauri Sims RN  12/03/18 3117

## 2018-12-03 NOTE — ED PROVIDER NOTES
History  Chief Complaint   Patient presents with    Chest Pain     pt reports left-sided chest pain that started a few days ago along with sob   Psychiatric Evaluation     pt reports that he feels as though someone is trying to kill him  "they're gonna shoot me in the head or something " denies SI/HI/AH/VH  History provided by:  Patient  Chest Pain   Pain location:  Substernal area  Pain quality: aching, radiating and sharp    Pain radiates to:  Upper back and mid back  Pain severity:  Moderate  Onset quality:  Gradual  Timing:  Intermittent  Progression:  Waxing and waning  Chronicity:  New  Relieved by:  Nothing  Worsened by:  Nothing tried  Ineffective treatments:  None tried  Associated symptoms: no abdominal pain, no cough, no dizziness, no dysphagia, no fever, no headache, no nausea, no numbness, no shortness of breath and no weakness    Psychiatric Evaluation   Presenting symptoms: hallucinations    Presenting symptoms: no suicidal thoughts    Associated symptoms: chest pain    Associated symptoms: no abdominal pain and no headaches        Prior to Admission Medications   Prescriptions Last Dose Informant Patient Reported? Taking? OLANZapine (ZyPREXA) 5 mg tablet   No Yes   Sig: Take 1 tablet (5 mg total) by mouth daily at bedtime   albuterol (PROVENTIL HFA,VENTOLIN HFA) 90 mcg/act inhaler   No Yes   Sig: Inhale 2 puffs every 6 (six) hours as needed for wheezing or shortness of breath   aspirin 81 mg chewable tablet   No Yes   Sig: Chew 1 tablet (81 mg total) daily   escitalopram (LEXAPRO) 10 mg tablet Not Taking at Unknown time  No No   Sig: Take 1 tablet (10 mg total) by mouth daily   Patient not taking: Reported on 12/3/2018    fluticasone-salmeterol (ADVAIR) 100-50 mcg/dose inhaler   Yes Yes   Sig: Inhale 1 puff 2 (two) times a day Rinse mouth after use     lisinopril (ZESTRIL) 10 mg tablet   No No   Sig: Take 1 tablet (10 mg total) by mouth daily   metFORMIN (GLUCOPHAGE) 500 mg tablet   No No   Sig: Take 1 tablet (500 mg total) by mouth 2 (two) times a day with meals   Patient taking differently: Take 1,000 mg by mouth 2 (two) times a day with meals     pantoprazole (PROTONIX) 40 mg tablet   Yes Yes   Sig: Take 40 mg by mouth daily   repaglinide (PRANDIN) 2 mg tablet   Yes Yes   Sig: Take 2 mg by mouth 3 (three) times a day before meals   rosuvastatin (CRESTOR) 5 mg tablet   Yes Yes   Sig: Take 5 mg by mouth daily at bedtime   sitaGLIPtin (JANUVIA) 50 mg tablet   Yes Yes   Sig: Take 50 mg by mouth daily   traMADol (ULTRAM) 50 mg tablet   Yes Yes   Sig: Take 50 mg by mouth as needed for moderate pain   traZODone (DESYREL) 50 mg tablet   Yes Yes   Sig: Take 50 mg by mouth daily at bedtime      Facility-Administered Medications: None       Past Medical History:   Diagnosis Date    Addiction to drug ([place] [address] )     Asbestos exposure     COPD (chronic obstructive pulmonary disease) ([place] [address] )     Diabetes mellitus ([place] [address] )     Drug use     Hypertension     Lung disease        Past Surgical History:   Procedure Laterality Date    NO PAST SURGERIES         History reviewed  No pertinent family history  I have reviewed and agree with the history as documented  Social History   Substance Use Topics    Smoking status: Current Every Day Smoker     Packs/day: 2 00    Smokeless tobacco: Never Used    Alcohol use No        Review of Systems   Constitutional: Negative for chills and fever  HENT: Negative for rhinorrhea, sore throat and trouble swallowing  Eyes: Negative for pain  Respiratory: Negative for cough, shortness of breath, wheezing and stridor  Cardiovascular: Positive for chest pain  Negative for leg swelling  Gastrointestinal: Negative for abdominal pain, diarrhea and nausea  Endocrine: Negative for polyuria  Genitourinary: Negative for dysuria, flank pain and urgency  Musculoskeletal: Negative for joint swelling, myalgias and neck stiffness  Skin: Negative for rash  Allergic/Immunologic: Negative for immunocompromised state  Neurological: Negative for dizziness, syncope, weakness, numbness and headaches  Psychiatric/Behavioral: Positive for dysphoric mood and hallucinations  Negative for confusion and suicidal ideas  All other systems reviewed and are negative  Physical Exam  Physical Exam   Constitutional: He is oriented to person, place, and time  He appears well-developed and well-nourished  HENT:   Head: Normocephalic and atraumatic  Eyes: Pupils are equal, round, and reactive to light  EOM are normal    Neck: Normal range of motion  Neck supple  Cardiovascular: Normal rate and regular rhythm  Exam reveals no friction rub  No murmur heard  Pulmonary/Chest: Breath sounds normal  No respiratory distress  He has no wheezes  He has no rales  He exhibits tenderness  Abdominal: Soft  Bowel sounds are normal  He exhibits no distension  There is no tenderness  Musculoskeletal: Normal range of motion  He exhibits no edema or tenderness  Neurological: He is alert and oriented to person, place, and time  Skin: Skin is warm  No rash noted  Psychiatric: He has a normal mood and affect  Nursing note and vitals reviewed        Vital Signs  ED Triage Vitals   Temperature Pulse Respirations Blood Pressure SpO2   12/03/18 0621 12/03/18 0547 12/03/18 0547 12/03/18 0547 12/03/18 0547   98 6 °F (37 °C) (!) 108 (!) 24 143/67 98 %      Temp src Heart Rate Source Patient Position - Orthostatic VS BP Location FiO2 (%)   -- 12/03/18 0547 -- -- --    Monitor         Pain Score       12/03/18 0547       Worst Possible Pain           Vitals:    12/03/18 0547 12/03/18 0614 12/03/18 0648   BP: 143/67 143/67 121/61   Pulse: (!) 108 (!) 108 89       Visual Acuity      ED Medications  Medications   acetaminophen (TYLENOL) tablet 650 mg (650 mg Oral Given 12/3/18 0636)   ibuprofen (MOTRIN) tablet 600 mg (600 mg Oral Given 12/3/18 0636)   aspirin chewable tablet 324 mg (324 mg Oral Given 12/3/18 0655)       Diagnostic Studies  Results Reviewed     Procedure Component Value Units Date/Time    B-type natriuretic peptide [[id number]]  (Normal) Collected:  12/03/18 2140    Lab Status:  Final result Specimen:  Blood from Arm, Left Updated:  12/03/18 0710     NT-proBNP 38 pg/mL     TSH [[id number]]  (Normal) Collected:  12/03/18 2036    Lab Status:  Final result Specimen:  Blood from Arm, Left Updated:  12/03/18 0708     TSH 3RD GENERATON 2 206 uIU/mL     Narrative:         Patients undergoing fluorescein dye angiography may retain small amounts of fluorescein in the body for 48-72 hours post procedure  Samples containing fluorescein can produce falsely depressed TSH values  If the patient had this procedure,a specimen should be resubmitted post fluorescein clearance  Troponin I [[id number]]  (Normal) Collected:  12/03/18 0605    Lab Status:  Final result Specimen:  Blood from Arm, Left Updated:  12/03/18 0641     Troponin I <0 02 ng/mL     Comprehensive metabolic panel [[id number]]  (Abnormal) Collected:  12/03/18 0605    Lab Status:  Final result Specimen:  Blood from Arm, Left Updated:  12/03/18 7317     Sodium 137 mmol/L      Potassium 3 6 mmol/L      Chloride 99 (L) mmol/L      CO2 20 (L) mmol/L      ANION GAP 18 (H) mmol/L      BUN 26 (H) mg/dL      Creatinine 1 52 (H) mg/dL      Glucose 218 (H) mg/dL      Calcium 9 2 mg/dL      AST 82 (H) U/L      ALT 72 U/L      Alkaline Phosphatase 92 U/L      Total Protein 8 0 g/dL      Albumin 3 9 g/dL      Total Bilirubin 0 76 mg/dL      eGFR 48 ml/min/1 73sq m     Narrative:         National Kidney Disease Education Program recommendations are as follows:  GFR calculation is accurate only with a steady state creatinine  Chronic Kidney disease less than 60 ml/min/1 73 sq  meters  Kidney failure less than 15 ml/min/1 73 sq  meters      POCT urinalysis dipstick [[id number]]     Lab Status:  No result     CBC and differential [[id number]]  (Abnormal) Collected:  12/03/18 0605    Lab Status:  Final result Specimen:  Blood from Arm, Left Updated:  12/03/18 0617     WBC 15 39 (H) Thousand/uL      RBC 5 17 Million/uL      Hemoglobin 14 6 g/dL      Hematocrit 45 0 %      MCV 87 fL      MCH 28 2 pg      MCHC 32 4 g/dL      RDW 13 6 %      MPV 9 8 fL      Platelets 917 Thousands/uL      nRBC 0 /100 WBCs      Neutrophils Relative 73 %      Immat GRANS % 1 %      Lymphocytes Relative 17 %      Monocytes Relative 8 %      Eosinophils Relative 1 %      Basophils Relative 0 %      Neutrophils Absolute 11 22 (H) Thousands/µL      Immature Grans Absolute 0 08 Thousand/uL      Lymphocytes Absolute 2 65 Thousands/µL      Monocytes Absolute 1 21 Thousand/µL      Eosinophils Absolute 0 17 Thousand/µL      Basophils Absolute 0 06 Thousands/µL     POCT alcohol breath test [[id number]]  (Normal) Resulted:  12/03/18 0610    Lab Status:  Final result Updated:  12/03/18 0610     EXTBreath Alcohol 0 000    Rapid drug screen, urine [[id number]]     Lab Status:  No result Specimen:  Urine                  X-ray chest 1 view portable   ED Interpretation by Nadiya Werner DO (12/03 0701)   NAD                 Procedures  Procedures       Phone Contacts  ED Phone Contact    ED Course  ED Course as of Dec 03 0816   Mon Dec 03, 2018   3231 Leukocytosis noted  Awaiting BMP troponin negative  Laboratory studies showed mild elevation in creatinine mild dehydration noted  IV fluids to be given    0656 Creatinine: (!) 1 52   0656 Creatinine: (!) 1 52   0708 Patient medically clearedAt this point time awaiting crisis evaluation            HEART Risk Score      Most Recent Value   History  0 Filed at: 12/03/2018 0657   ECG  0 Filed at: 12/03/2018 0636   Age  1 Filed at: 12/03/2018 0657   Risk Factors  1 Filed at: 12/03/2018 0657   Troponin  0 Filed at: 12/03/2018 0657   Heart Score Risk Calculator   History  0 Filed at: 12/03/2018 0657   ECG  0 Filed at: 12/03/2018 7243   Age  1 Filed at: 12/03/2018 2726   Risk Factors  1 Filed at: 12/03/2018 0657   Troponin  0 Filed at: 12/03/2018 1803   HEART Score  2 Filed at: 12/03/2018 4817   HEART Score  2 Filed at: 12/03/2018 2604                            MDM  Number of Diagnoses or Management Options  Chest discomfort:   Depressed:   Methamphetamine abuse [facility]):   Diagnosis management comments: 71-year-old male presents emergency department chest discomfort  Psychiatric history as well as needing inpatient psychiatric management cardiac rule in the emergency department  Likely plan on consulting crisis  8:15 AM  Discussed with the crisis worker at this point time states that she is recommends that the patient can be discharged  Plan outpatient management followup given strict instructions when to return back to the emergency department no evidence of suicidal homicidal ideation  Discussed with the patient about the chest discomfort in the need for follow-up and also encourage p o  Intake given the fact the patient is mildly dehydrated on labs with some mild elevation in the creatinine and BUN  No indication for stay in the hospital at this point time informed the patient the need to follow up and repeat labs approximately 3 days after p  O  Intake  Increased  Pt re-examined and evaluated after testing and treatment  Spoke with the patient and feeling improved and sxs have resolved  Will discharge home with close f/u with pcp and instructed to return to the ED if sxs worsen or continue  Pt agrees with the plan for discharge and feels comfortable to go home with proper f/u  Advised to return for worsening or additional problems  Diagnostic tests were reviewed and questions answered  Diagnosis, care plan and treatment options were discussed  The patient understand instructions and will follow up as directed             Amount and/or Complexity of Data Reviewed  Clinical lab tests: ordered and reviewed  Tests in the radiology section of CPT®: ordered and reviewed  Decide to obtain previous medical records or to obtain history from someone other than the patient: yes  Independent visualization of images, tracings, or specimens: yes (All labs reviewed and utilized in the medical decision making process    All radiology studies independently viewed by me and interpreted by the radiologist )      CritCare Time    Disposition  Final diagnoses:   Depressed   Methamphetamine abuse ([facility] [address] )   Chest discomfort     Time reflects when diagnosis was documented in both MDM as applicable and the Disposition within this note     Time User Action Codes Description Comment    12/3/2018  8:01 AM David Harding R Add [F32 9] Depressed     12/3/2018  8:02 AM Dewey Villagran Add [F15 10] Methamphetamine abuse ([facility] [address] )     12/3/2018  8:02 AM Dewey Peace Add [R07 9,  I25 10] Chest pain due to coronary artery disease     12/3/2018  8:02 AM Ignacia Otero Remove [R07 9,  I25 10] Chest pain due to coronary artery disease     12/3/2018  8:02 AM Dewey Peace Add [R07 89] Chest discomfort       ED Disposition     ED Disposition Condition Comment    Discharge  [address] discharge to home/self care  Condition at discharge: Stable        MD Documentation      Most Recent Value   Sending MD Dr Francisco Mazariegos    None         Patient's Medications   Discharge Prescriptions    No medications on file     No discharge procedures on file      ED Provider  Electronically Signed by           Nicci Bautista DO  12/03/18 6959

## 2018-12-03 NOTE — ED PROCEDURE NOTE
PROCEDURE  ECG 12 Lead Documentation  Date/Time: 12/3/2018 6:29 AM  Performed by: Eric Madison by: Scott Ge     ECG reviewed by me, the ED Provider: yes    Patient location:  ED  Previous ECG:     Previous ECG:  Compared to current    Similarity:  No change  Interpretation:     Interpretation: abnormal    Rate:     ECG rate assessment: tachycardic    Rhythm:     Rhythm: sinus tachycardia    Ectopy:     Ectopy: none    QRS:     QRS axis:  Normal    QRS intervals:  Normal  Conduction:     Conduction: normal    ST segments:     ST segments:  Normal  T waves:     T waves: normal           Katerina Baez DO  12/03/18 0164

## 2018-12-03 NOTE — ED NOTES
Patient unable to provide urine sample at this time   Aware one is needed     Zoe Price RN  12/03/18 8288

## 2018-12-03 NOTE — ED NOTES
Pt reports she was seeing people coming after him and wanted to stab him  He reports he was doing meth and started having feelings of paranoia accompanied by chest pain  Pt reports he uses 50 00 a day of meth and has been using for the past 2-3 years  Pt denies suicidal and homicidal ideations and denies auditory and visual hallucinations when he isnt using  Pt reports he is safe at home and is comfortable with discharge

## 2018-12-04 LAB
ATRIAL RATE: 107 BPM
P AXIS: 81 DEGREES
PR INTERVAL: 140 MS
QRS AXIS: 71 DEGREES
QRSD INTERVAL: 98 MS
QT INTERVAL: 334 MS
QTC INTERVAL: 445 MS
T WAVE AXIS: 77 DEGREES
VENTRICULAR RATE: 107 BPM

## 2018-12-04 PROCEDURE — 93010 ELECTROCARDIOGRAM REPORT: CPT | Performed by: INTERNAL MEDICINE

## 2024-04-26 NOTE — ED NOTES
Patient to follow with outpatient referrals and will return to the Emergency Department if symptoms worsen  72.6 No